# Patient Record
Sex: FEMALE | Race: WHITE | Employment: OTHER | URBAN - METROPOLITAN AREA
[De-identification: names, ages, dates, MRNs, and addresses within clinical notes are randomized per-mention and may not be internally consistent; named-entity substitution may affect disease eponyms.]

---

## 2017-04-05 ENCOUNTER — HOSPITAL ENCOUNTER (EMERGENCY)
Age: 67
Discharge: HOME | End: 2017-04-05
Attending: FAMILY MEDICINE

## 2017-04-05 VITALS
RESPIRATION RATE: 16 BRPM | BODY MASS INDEX: 35.27 KG/M2 | TEMPERATURE: 98.7 F | HEIGHT: 65 IN | OXYGEN SATURATION: 99 % | WEIGHT: 211.7 LBS | SYSTOLIC BLOOD PRESSURE: 206 MMHG | DIASTOLIC BLOOD PRESSURE: 84 MMHG | HEART RATE: 64 BPM

## 2017-04-05 DIAGNOSIS — H18.822 CORNEAL ABRASION OF LEFT EYE DUE TO CONTACT LENS: Primary | ICD-10-CM

## 2017-04-05 DIAGNOSIS — S05.8X2A EYE ABRASION, LEFT, INITIAL ENCOUNTER: ICD-10-CM

## 2017-04-05 RX ORDER — BACITRACIN 500 [USP'U]/G
OINTMENT OPHTHALMIC
Qty: 3.5 G | Refills: 0 | Status: SHIPPED | OUTPATIENT
Start: 2017-04-05 | End: 2018-01-03

## 2017-04-05 RX ORDER — KETOROLAC TROMETHAMINE 5 MG/ML
1 SOLUTION OPHTHALMIC 4 TIMES DAILY
Qty: 1 BOTTLE | Refills: 0 | Status: SHIPPED | OUTPATIENT
Start: 2017-04-05 | End: 2017-04-09

## 2017-04-05 RX ORDER — OMEPRAZOLE 20 MG/1
20 CAPSULE, DELAYED RELEASE ORAL DAILY
COMMUNITY

## 2017-04-05 RX ORDER — OXYCODONE AND ACETAMINOPHEN 5; 325 MG/1; MG/1
1 TABLET ORAL
Qty: 20 TAB | Refills: 0 | Status: SHIPPED | OUTPATIENT
Start: 2017-04-05 | End: 2018-01-03

## 2017-04-05 NOTE — UC PROVIDER NOTE
Patient is a 74 y.o. female presenting with eye pain. The history is provided by the patient. No  was used. Eye Pain    This is a new problem. The current episode started yesterday. The problem occurs constantly. The problem has not changed since onset. The left eye is affected. The injury mechanism was none. The pain is at a severity of 6/10. The pain is moderate. There is no history of trauma (Out playing golf yesterday, felt like sand in eye. + contact lens use. ) to the eye. There is no known exposure to pink eye. She wears contacts. Associated symptoms include photophobia, negative and pain. Pertinent negatives include no discharge, no nausea and no vomiting. Associated symptoms comments: Tearing to left eye. She has tried nothing for the symptoms. The treatment provided no relief. History reviewed. No pertinent past medical history. Past Surgical History:   Procedure Laterality Date    HX CHOLECYSTECTOMY      HX ORTHOPAEDIC      back and foot         History reviewed. No pertinent family history. Social History     Social History    Marital status:      Spouse name: N/A    Number of children: N/A    Years of education: N/A     Occupational History    Not on file. Social History Main Topics    Smoking status: Never Smoker    Smokeless tobacco: Not on file    Alcohol use Not on file    Drug use: Not on file    Sexual activity: Not on file     Other Topics Concern    Not on file     Social History Narrative                ALLERGIES: Review of patient's allergies indicates no known allergies. Review of Systems   Constitutional: Negative. HENT: Negative. Eyes: Positive for photophobia and pain. Negative for discharge. Respiratory: Negative. Cardiovascular: Negative. Gastrointestinal: Negative. Negative for nausea and vomiting. Endocrine: Negative. Genitourinary: Negative. Musculoskeletal: Negative. Neurological: Negative. Hematological: Negative. Psychiatric/Behavioral: Negative. Vitals:    04/05/17 1724   BP: (!) 208/91   Pulse: 64   Resp: 16   Temp: 98.7 °F (37.1 °C)   SpO2: 99%   Weight: 96 kg (211 lb 11.2 oz)   Height: 5' 5\" (1.651 m)       Physical Exam   Constitutional: She is oriented to person, place, and time. She appears well-developed and well-nourished. HENT:   Head: Normocephalic and atraumatic. Right Ear: External ear normal.   Left Ear: External ear normal.   Nose: Nose normal.   Mouth/Throat: Oropharynx is clear and moist.   Eyes: EOM are normal. Pupils are equal, round, and reactive to light. Right eye exhibits no discharge. Left eye exhibits no discharge. No scleral icterus. Neck: Normal range of motion. Neck supple. Cardiovascular: Normal rate, regular rhythm and normal heart sounds. Pulmonary/Chest: Effort normal and breath sounds normal.   Musculoskeletal: Normal range of motion. Lymphadenopathy:     She has no cervical adenopathy. Neurological: She is alert and oriented to person, place, and time. Skin: Skin is warm and dry. Psychiatric: She has a normal mood and affect. Her behavior is normal. Judgment and thought content normal.   Nursing note and vitals reviewed. MDM     Differential Diagnosis; Clinical Impression; Plan:     CLINICAL IMPRESSION:  Corneal abrasion of left eye due to contact lens  (primary encounter diagnosis)    Plan:  1. There is a large corneal abrasion to the left eye. This mostly likely is causing your pain. I will send you home with eye ointment and eye drops for pain. 2. No contacts until you follow up with your eye doctor  3. Percocet for pain. Risk of Significant Complications, Morbidity, and/or Mortality:   Presenting problems: Moderate  Diagnostic procedures:   Moderate  Progress:   Patient progress:  Stable      Procedures

## 2018-01-03 ENCOUNTER — HOSPITAL ENCOUNTER (EMERGENCY)
Age: 68
Discharge: HOME | End: 2018-01-03
Attending: FAMILY MEDICINE

## 2018-01-03 VITALS
HEART RATE: 73 BPM | TEMPERATURE: 97.8 F | OXYGEN SATURATION: 98 % | DIASTOLIC BLOOD PRESSURE: 79 MMHG | RESPIRATION RATE: 18 BRPM | SYSTOLIC BLOOD PRESSURE: 178 MMHG

## 2018-01-03 DIAGNOSIS — J20.9 ACUTE BRONCHITIS, UNSPECIFIED ORGANISM: Primary | ICD-10-CM

## 2018-01-03 RX ORDER — CETIRIZINE HCL 10 MG
10 TABLET ORAL DAILY
Qty: 10 TAB | Refills: 0 | Status: SHIPPED | OUTPATIENT
Start: 2018-01-03 | End: 2018-01-13

## 2018-01-03 RX ORDER — PREDNISONE 20 MG/1
TABLET ORAL
Qty: 8 TAB | Refills: 0 | Status: SHIPPED | OUTPATIENT
Start: 2018-01-03 | End: 2019-07-20

## 2018-01-03 RX ORDER — BENZONATATE 100 MG/1
100 CAPSULE ORAL
Qty: 21 CAP | Refills: 0 | Status: SHIPPED | OUTPATIENT
Start: 2018-01-03 | End: 2018-01-10

## 2018-01-04 NOTE — DISCHARGE INSTRUCTIONS
Bronchitis: Care Instructions  Your Care Instructions    Bronchitis is inflammation of the bronchial tubes, which carry air to the lungs. The tubes swell and produce mucus, or phlegm. The mucus and inflamed bronchial tubes make you cough. You may have trouble breathing. Most cases of bronchitis are caused by viruses like those that cause colds. Antibiotics usually do not help and they may be harmful. Bronchitis usually develops rapidly and lasts about 2 to 3 weeks in otherwise healthy people. Follow-up care is a key part of your treatment and safety. Be sure to make and go to all appointments, and call your doctor if you are having problems. It's also a good idea to know your test results and keep a list of the medicines you take. How can you care for yourself at home? · Take all medicines exactly as prescribed. Call your doctor if you think you are having a problem with your medicine. · Get some extra rest.  · Take an over-the-counter pain medicine, such as acetaminophen (Tylenol), ibuprofen (Advil, Motrin), or naproxen (Aleve) to reduce fever and relieve body aches. Read and follow all instructions on the label. · Do not take two or more pain medicines at the same time unless the doctor told you to. Many pain medicines have acetaminophen, which is Tylenol. Too much acetaminophen (Tylenol) can be harmful. · Take an over-the-counter cough medicine that contains dextromethorphan to help quiet a dry, hacking cough so that you can sleep. Avoid cough medicines that have more than one active ingredient. Read and follow all instructions on the label. · Breathe moist air from a humidifier, hot shower, or sink filled with hot water. The heat and moisture will thin mucus so you can cough it out. · Do not smoke. Smoking can make bronchitis worse. If you need help quitting, talk to your doctor about stop-smoking programs and medicines. These can increase your chances of quitting for good.   When should you call for help? Call 911 anytime you think you may need emergency care. For example, call if:  ? · You have severe trouble breathing. ?Call your doctor now or seek immediate medical care if:  ? · You have new or worse trouble breathing. ? · You cough up dark brown or bloody mucus (sputum). ? · You have a new or higher fever. ? · You have a new rash. ? Watch closely for changes in your health, and be sure to contact your doctor if:  ? · You cough more deeply or more often, especially if you notice more mucus or a change in the color of your mucus. ? · You are not getting better as expected. Where can you learn more? Go to http://ildefonso-lina.info/. Enter H333 in the search box to learn more about \"Bronchitis: Care Instructions. \"  Current as of: May 12, 2017  Content Version: 11.4  © 0867-2346 [employer]. Care instructions adapted under license by [employer] (which disclaims liability or warranty for this information). If you have questions about a medical condition or this instruction, always ask your healthcare professional. [address] any warranty or liability for your use of this information.

## 2018-01-04 NOTE — UC PROVIDER NOTE
HPI Comments:   Here for dry cough. [facility] (2 days ago) given z pack and told to take pseudoephedrine/mucinex. Has taken and hasnt noticed improvement. No worsening. Symptoms intermittent throughout the day. No other associated symptoms. Denies fever, chills, SOB, chest pain, pleuritic pain, dizziness. Otherwise feels well. Difficulty sleeping as coughing so much. Denies: being a smoker, asthma or other lung conditions. Patient is a 69 y.o. female presenting with cough. Cough   Pertinent negatives include no chest pain, no chills, no wheezing, no nausea and no vomiting. History reviewed. No pertinent past medical history. Past Surgical History:   Procedure Laterality Date    HX CHOLECYSTECTOMY      HX ORTHOPAEDIC      back and foot         History reviewed. No pertinent family history. Social History     Social History    Marital status:      Spouse name: N/A    Number of children: N/A    Years of education: N/A     Occupational History    Not on file. Social History Main Topics    Smoking status: Never Smoker    Smokeless tobacco: Not on file    Alcohol use Not on file    Drug use: Not on file    Sexual activity: Not on file     Other Topics Concern    Not on file     Social History Narrative                ALLERGIES: Sulfa (sulfonamide antibiotics)    Review of Systems   Constitutional: Negative for chills, diaphoresis, fatigue and fever. Respiratory: Positive for cough. Negative for chest tightness, wheezing and stridor. Cardiovascular: Negative for chest pain, palpitations and leg swelling. Gastrointestinal: Negative for nausea and vomiting. All other systems reviewed and are negative. Vitals:    01/03/18 2001   BP: 178/79   Pulse: 73   Resp: 18   Temp: 97.8 °F (36.6 °C)   SpO2: 98%       Physical Exam   Constitutional: She is oriented to person, place, and time. No distress.    Appears well   HENT:   Nose: Nose normal.   Mouth/Throat: Oropharynx is clear and moist. No oropharyngeal exudate. Eyes: Conjunctivae and EOM are normal. Pupils are equal, round, and reactive to light. Neck: Normal range of motion. Neck supple. No thyromegaly present. Cardiovascular: Normal rate, regular rhythm and normal heart sounds. Exam reveals no gallop and no friction rub. No murmur heard. Pulmonary/Chest: Effort normal and breath sounds normal. No respiratory distress. She has no wheezes. She has no rales. Lymphadenopathy:     She has no cervical adenopathy. Neurological: She is alert and oriented to person, place, and time. Skin: Skin is warm and dry. She is not diaphoretic. Psychiatric: She has a normal mood and affect. Her behavior is normal. Thought content normal.       [facility]     Differential Diagnosis; Clinical Impression; Plan:       CLINICAL IMPRESSION:  (J20.9) Acute bronchitis, unspecified organism  (primary encounter diagnosis)    Orders Placed This Encounter      predniSONE (DELTASONE) 20 mg tablet      benzonatate (TESSALON PERLES) 100 mg capsule      cetirizine (ZYRTEC) 10 mg tablet    DDX: GERD, reactive airway, PND cough syndrome  VS WNL, no fevers, appears well with normal lung exam.  Discussed treatment options. Will continue azithromycin;. It has also only been 2 days on antibiotic without worsening. Plan:  1. See above orders. 2. Follow up with PCP without improvement in 4-5 days. 3. May return to urgent care for any new or worsening. 4. Consider CXR without improvement on above therapies.     Risk of Significant Complications, Morbidity, and/or Mortality:   Presenting problems:  Low  Diagnostic procedures:  Low  Management options:  Low  Progress:   Patient progress:  Stable      Procedures

## 2019-07-20 ENCOUNTER — OFFICE VISIT (OUTPATIENT)
Dept: URGENT CARE | Age: 69
End: 2019-07-20

## 2019-07-20 VITALS
SYSTOLIC BLOOD PRESSURE: 224 MMHG | OXYGEN SATURATION: 97 % | HEIGHT: 65 IN | TEMPERATURE: 98.5 F | WEIGHT: 218 LBS | DIASTOLIC BLOOD PRESSURE: 92 MMHG | RESPIRATION RATE: 18 BRPM | BODY MASS INDEX: 36.32 KG/M2 | HEART RATE: 75 BPM

## 2019-07-20 DIAGNOSIS — E66.01 SEVERE OBESITY (HCC): ICD-10-CM

## 2019-07-20 RX ORDER — AMOXICILLIN 875 MG/1
875 TABLET, FILM COATED ORAL 2 TIMES DAILY
Qty: 20 TAB | Refills: 0 | Status: SHIPPED | OUTPATIENT
Start: 2019-07-20 | End: 2019-07-30

## 2019-07-20 RX ORDER — AMLODIPINE BESYLATE 10 MG/1
5 TABLET ORAL DAILY
Qty: 30 TAB | Refills: 0 | Status: SHIPPED | OUTPATIENT
Start: 2019-07-20 | End: 2022-06-04

## 2019-07-20 NOTE — PROGRESS NOTES
Subjective:      History was provided by the patient. Oma Sloan is a 77 y.o. female who presents for evaluation of symptoms of a URI. Symptoms include sinus and nasal congestion, sore throat, nasal blockage and post nasal drip. Onset of symptoms was 1 week ago, gradually worsening since that time. She also c/o 1 week productive cough with  yellow colored sputum. She is drinking plenty of fluids. . Evaluation to date: none. Treatment to date: none  History reviewed. No pertinent past medical history. Current Outpatient Medications   Medication Sig Dispense Refill    omeprazole (PRILOSEC) 20 mg capsule Take 20 mg by mouth daily.        Allergies   Allergen Reactions    Codeine Nausea and Vomiting    Sulfa (Sulfonamide Antibiotics) Rash     Social History     Socioeconomic History    Marital status:      Spouse name: Not on file    Number of children: Not on file    Years of education: Not on file    Highest education level: Not on file   Occupational History    Not on file   Social Needs    Financial resource strain: Not on file    Food insecurity:     Worry: Not on file     Inability: Not on file    Transportation needs:     Medical: Not on file     Non-medical: Not on file   Tobacco Use    Smoking status: Never Smoker    Smokeless tobacco: Never Used   Substance and Sexual Activity    Alcohol use: Not on file    Drug use: Not on file    Sexual activity: Not on file   Lifestyle    Physical activity:     Days per week: Not on file     Minutes per session: Not on file    Stress: Not on file   Relationships    Social connections:     Talks on phone: Not on file     Gets together: Not on file     Attends [employer] service: Not on file     Active member of club or organization: Not on file     Attends meetings of clubs or organizations: Not on file     Relationship status: Not on file    Intimate partner violence:     Fear of current or ex partner: Not on file     Emotionally abused: Not on file     Physically abused: Not on file     Forced sexual activity: Not on file   Other Topics Concern    Not on file   Social History Narrative    Not on file     Review of Systems  A comprehensive review of systems was negative except for that written in the HPI. Objective: There were no vitals taken for this visit. General:  alert, cooperative, no distress, appears stated age   Head:  NCAT w/o lesions or tenderness   Eyes: negative   Ears: normal TM's and external ear canals AU   Sinus tender: negative   Mouth:  Lips, mucosa, and tongue normal. Teeth and gums normal   Neck: supple, symmetrical, trachea midline, no adenopathy, thyroid: not enlarged, symmetric, no tenderness/mass/nodules, no carotid bruit and no JVD. Lungs: clear to auscultation bilaterally   Abdomen: soft, non-tender. Bowel sounds normal. No masses,  no organomegaly        Assessment:     sinusitis and bronchitis    Plan:   Discussed the dx and tx of sinusitis. Suggested symptomatic OTC remedies. Antibiotics per orders. RTC prn.

## 2019-07-20 NOTE — PATIENT INSTRUCTIONS
SEE YOUR PCP Monday   Amlodipine (By mouth)   Amlodipine ([id number]-[id number])  Treats high blood pressure and angina (chest pain). This medicine is a calcium channel blocker. Brand Name(s): Norvasc   There may be other brand names for this medicine. When This Medicine Should Not Be Used: This medicine is not right for everyone. Do not use it if you had an allergic reaction to amlodipine. How to Use This Medicine:   Tablet, Dissolving Tablet  · Take your medicine as directed. Your dose may need to be changed several times to find what works best for you. Take this medicine at the same time each day. · Read and follow the patient instructions that come with this medicine. Talk to your doctor or pharmacist if you have any questions. · Missed dose: Take a dose as soon as you remember. If it has been more than 12 hours since you were supposed to take your dose, skip the missed dose and take your next regular dose at the regular time. · Store the medicine in a closed container at room temperature, away from heat, moisture, and direct light. Drugs and Foods to Avoid:   Ask your doctor or pharmacist before using any other medicine, including over-the-counter medicines, vitamins, and herbal products. · Some medicines can affect how amlodipine works. Tell your doctor if you are also using any of the following:   ¨ Clarithromycin, cyclosporine, diltiazem, itraconazole, ritonavir, sildenafil, simvastatin, tacrolimus  Warnings While Using This Medicine:   · Tell your doctor if you are pregnant or breastfeeding, or if you have liver disease, heart disease, coronary artery disease, or aortic stenosis. · This medicine could lower your blood pressure too much, especially when you first use it or if you are dehydrated. Stand or sit up slowly if you feel lightheaded or dizzy. · Your doctor will check your progress and the effects of this medicine at regular visits. Keep all appointments.   · Do not stop using this medicine without asking your doctor, even if you feel well. This medicine will not cure high blood pressure, but it will help keep it in normal range. You may have to take blood pressure medicine for the rest of your life. · Keep all medicine out of the reach of children. Never share your medicine with anyone. Possible Side Effects While Using This Medicine:   Call your doctor right away if you notice any of these side effects:  · Allergic reaction: Itching or hives, swelling in your face or hands, swelling or tingling in your mouth or throat, chest tightness, trouble breathing  · Lightheadedness, dizziness  · New or worsening chest pain  · Swelling in your hands, ankles, or legs  · Trouble breathing, nausea, unusual sweating, fainting  If you notice other side effects that you think are caused by this medicine, tell your doctor. Call your doctor for medical advice about side effects. You may report side effects to FDA at 2-124-FDA-8872  © 2017 [facility] Information is for End User's use only and may not be sold, redistributed or otherwise used for commercial purposes. The above information is an  only. It is not intended as medical advice for individual conditions or treatments. Talk to your doctor, nurse or pharmacist before following any medical regimen to see if it is safe and effective for you. Home Blood Pressure Test: About This Test  What is it? A home blood pressure test allows you to keep track of your blood pressure at home. Blood pressure is a measure of the force of blood against the walls of your arteries. Blood pressure readings include two numbers, such as 130/80 (say \"130 over 80\"). The first number is the systolic pressure. The second number is the diastolic pressure. Why is this test done? You may do this test at home to:  · Find out if you have high blood pressure. · Track your blood pressure if you have high blood pressure.   · Track how well medicine is working to reduce high blood pressure. · Check how lifestyle changes, such as weight loss and exercise, are affecting blood pressure. How can you prepare for the test?  · Do not use caffeine, tobacco, or medicines known to raise blood pressure (such as nasal decongestant sprays) for at least 30 minutes before taking your blood pressure. · Do not exercise for at least 30 minutes before taking your blood pressure. What happens before the test?  Take your blood pressure while you feel comfortable and relaxed. Sit quietly with both feet on the floor for at least 5 minutes before the test.  What happens during the test?  · Sit with your arm slightly bent and resting on a table so that your upper arm is at the same level as your heart. · Roll up your sleeve or take off your shirt to expose your upper arm. · Wrap the blood pressure cuff around your upper arm so that the lower edge of the cuff is about 1 inch above the bend of your elbow. Proceed with the following steps depending on if you are using an automatic or manual pressure monitor. Automatic blood pressure monitors  · Press the on/off button on the automatic monitor and wait until the ready-to-measure \"heart\" symbol appears next to zero in the display window. · Press the start button. The cuff will inflate and deflate by itself. · Your blood pressure numbers will appear on the screen. · Write your numbers in your log book, along with the date and time. Manual blood pressure monitors  · Place the earpieces of a stethoscope in your ears, and place the bell of the stethoscope over the artery, just below the cuff. · Close the valve on the rubber inflating bulb. · Squeeze the bulb rapidly with your opposite hand to inflate the cuff until the dial or column of mercury reads about 30 mm Hg higher than your usual systolic pressure. If you do not know your usual pressure, inflate the cuff to 210 mm Hg or until the pulse at your wrist disappears.   · Open the pressure valve just slightly by twisting or pressing the valve on the bulb. · As you watch the pressure slowly fall, note the level on the dial at which you first start to hear a pulsing or tapping sound through the stethoscope. This is your systolic blood pressure. · Continue letting the air out slowly. The sounds will become muffled and will finally disappear. Note the pressure when the sounds completely disappear. This is your diastolic blood pressure. Let out all the remaining air. · Write your numbers in your log book, along with the date and time. What else should you know about the test?  It is more accurate to take the average of several readings made throughout the day than to rely on a single reading. It's normal for blood pressure to go up and down throughout the day. Follow-up care is a key part of your treatment and safety. Be sure to make and go to all appointments, and call your doctor if you are having problems. It's also a good idea to keep a list of the medicines you take. Where can you learn more? Go to http://ildefonso-lina.info/. Enter C427 in the search box to learn more about \"Home Blood Pressure Test: About This Test.\"  Current as of: July 22, 2018  Content Version: 12.1  © 4774-2754 [employer]. Care instructions adapted under license by [employer] (which disclaims liability or warranty for this information). If you have questions about a medical condition or this instruction, always ask your healthcare professional. [place] [address] any warranty or liability for your use of this information.

## 2022-03-19 PROBLEM — E66.01 SEVERE OBESITY (HCC): Status: ACTIVE | Noted: 2019-07-20

## 2022-06-04 ENCOUNTER — OFFICE VISIT (OUTPATIENT)
Dept: URGENT CARE | Age: 72
End: 2022-06-04
Payer: MEDICARE

## 2022-06-04 VITALS
HEIGHT: 66 IN | OXYGEN SATURATION: 97 % | TEMPERATURE: 98.4 F | BODY MASS INDEX: 34.72 KG/M2 | RESPIRATION RATE: 16 BRPM | WEIGHT: 216 LBS | HEART RATE: 69 BPM | DIASTOLIC BLOOD PRESSURE: 88 MMHG | SYSTOLIC BLOOD PRESSURE: 200 MMHG

## 2022-06-04 DIAGNOSIS — R05.9 COUGH: ICD-10-CM

## 2022-06-04 DIAGNOSIS — I10 HYPERTENSION, UNSPECIFIED TYPE: Primary | ICD-10-CM

## 2022-06-04 DIAGNOSIS — J06.9 VIRAL UPPER RESPIRATORY TRACT INFECTION: ICD-10-CM

## 2022-06-04 PROCEDURE — G8536 NO DOC ELDER MAL SCRN: HCPCS | Performed by: NURSE PRACTITIONER

## 2022-06-04 PROCEDURE — 1090F PRES/ABSN URINE INCON ASSESS: CPT | Performed by: NURSE PRACTITIONER

## 2022-06-04 PROCEDURE — 99213 OFFICE O/P EST LOW 20 MIN: CPT | Performed by: NURSE PRACTITIONER

## 2022-06-04 PROCEDURE — 1101F PT FALLS ASSESS-DOCD LE1/YR: CPT | Performed by: NURSE PRACTITIONER

## 2022-06-04 PROCEDURE — G8432 DEP SCR NOT DOC, RNG: HCPCS | Performed by: NURSE PRACTITIONER

## 2022-06-04 PROCEDURE — G8417 CALC BMI ABV UP PARAM F/U: HCPCS | Performed by: NURSE PRACTITIONER

## 2022-06-04 PROCEDURE — G8753 SYS BP > OR = 140: HCPCS | Performed by: NURSE PRACTITIONER

## 2022-06-04 PROCEDURE — G8754 DIAS BP LESS 90: HCPCS | Performed by: NURSE PRACTITIONER

## 2022-06-04 PROCEDURE — 1123F ACP DISCUSS/DSCN MKR DOCD: CPT | Performed by: NURSE PRACTITIONER

## 2022-06-04 PROCEDURE — G8427 DOCREV CUR MEDS BY ELIG CLIN: HCPCS | Performed by: NURSE PRACTITIONER

## 2022-06-04 PROCEDURE — G8400 PT W/DXA NO RESULTS DOC: HCPCS | Performed by: NURSE PRACTITIONER

## 2022-06-04 PROCEDURE — 3017F COLORECTAL CA SCREEN DOC REV: CPT | Performed by: NURSE PRACTITIONER

## 2022-06-04 RX ORDER — GUAIFENESIN 600 MG/1
600 TABLET, EXTENDED RELEASE ORAL 2 TIMES DAILY
Qty: 14 TABLET | Refills: 0 | Status: SHIPPED | OUTPATIENT
Start: 2022-06-04

## 2022-06-04 RX ORDER — AMLODIPINE BESYLATE 5 MG/1
5 TABLET ORAL DAILY
Qty: 10 TABLET | Refills: 0 | Status: SHIPPED | OUTPATIENT
Start: 2022-06-04

## 2022-06-04 RX ORDER — BENZONATATE 100 MG/1
100 CAPSULE ORAL
Qty: 21 CAPSULE | Refills: 0 | Status: SHIPPED | OUTPATIENT
Start: 2022-06-04 | End: 2022-06-11

## 2022-06-04 RX ORDER — MELATONIN
1000 DAILY
COMMUNITY

## 2022-06-04 NOTE — PROGRESS NOTES
OFFICE NOTE    Jeny Mac is a 66 y.o. female presenting today for the following:  Chief Complaint   Patient presents with    Cold Symptoms     Pt here today with complaint of stuffy nose, sinus pressure, chest congestion since yesterday. Had sore throat yesterday, somewhat improved today. Reports cough productive of yellow sputum. HPI     Patient is coming in the  clinic today with symptoms of sinus congestion, chest congestion and sore throat and cough of yellow sputum. Patient states sore throat started yesterday but have improved. Today patient blood pressure is elevated and does not see her PCP regularly. Patient denies chest pain, dyspnea or headache. She states she have a new PCP named Soha Lin. Patient has taken amlodipine in the past and states she have not went back to her PCP for assessment and refill. She states she did well on the amlodipine but have not taken it in over a year. Informed patient today I will start her back on the amlodipine but smaller dose due todays elevated BP x 2. Advised patient to monitor her blood pressure readings while on the amlodipine and call her pcp Monday morning due to the elevated BP and to continue the management of the BP medication if needed. Patient agreed with plan today. Patient denies any pain today. Review of Systems   Constitutional: Negative for chills, fatigue and fever. HENT: Positive for congestion (sinus), postnasal drip, rhinorrhea and sore throat. Negative for ear discharge, ear pain, hearing loss, sinus pressure, sinus pain, trouble swallowing and voice change. Eyes: Negative. Respiratory: Positive for cough. Negative for shortness of breath and wheezing. Cardiovascular: Negative for chest pain. Gastrointestinal: Negative for nausea and vomiting. Genitourinary: Negative. Musculoskeletal: Negative. Skin: Negative.     Neurological: Negative for dizziness, syncope, weakness, light-headedness and headaches. History  History reviewed. No pertinent past medical history. Past Surgical History:   Procedure Laterality Date    HX CHOLECYSTECTOMY      HX ORTHOPAEDIC      back and foot       Social History     Socioeconomic History    Marital status:      Spouse name: Not on file    Number of children: Not on file    Years of education: Not on file    Highest education level: Not on file   Occupational History    Not on file   Tobacco Use    Smoking status: Never Smoker    Smokeless tobacco: Never Used   Substance and Sexual Activity    Alcohol use: Not on file    Drug use: Not on file    Sexual activity: Not on file   Other Topics Concern    Not on file   Social History Narrative    Not on file     Social Determinants of Health     Financial Resource Strain:     Difficulty of Paying Living Expenses: Not on file   Food Insecurity:     Worried About Running Out of Food in the Last Year: Not on file    Otto of Food in the Last Year: Not on file   Transportation Needs:     Lack of Transportation (Medical): Not on file    Lack of Transportation (Non-Medical):  Not on file   Physical Activity:     Days of Exercise per Week: Not on file    Minutes of Exercise per Session: Not on file   Stress:     Feeling of Stress : Not on file   Social Connections:     Frequency of Communication with Friends and Family: Not on file    Frequency of Social Gatherings with Friends and Family: Not on file    Attends Evangelical Services: Not on file    Active Member of Clubs or Organizations: Not on file    Attends Club or Organization Meetings: Not on file    Marital Status: Not on file   Intimate Partner Violence:     Fear of Current or Ex-Partner: Not on file    Emotionally Abused: Not on file    Physically Abused: Not on file    Sexually Abused: Not on file   Housing Stability:     Unable to Pay for Housing in the Last Year: Not on file    Number of [place] in the Last Year: Not on file    Unstable Housing in the Last Year: Not on file       Allergies   Allergen Reactions    Codeine Nausea and Vomiting    Sulfa (Sulfonamide Antibiotics) Rash       Current Outpatient Medications   Medication Sig Dispense Refill    cholecalciferol (Vitamin D3) (1000 Units /25 mcg) tablet Take 1,000 Units by mouth daily.  amLODIPine (NORVASC) 5 mg tablet Take 1 Tablet by mouth daily. 10 Tablet 0    benzonatate (TESSALON) 100 mg capsule Take 1 Capsule by mouth three (3) times daily as needed for Cough for up to 7 days. 21 Capsule 0    guaiFENesin ER (MUCINEX) 600 mg ER tablet Take 1 Tablet by mouth two (2) times a day. 14 Tablet 0    omeprazole (PRILOSEC) 20 mg capsule Take 20 mg by mouth daily. Patient Care Team:  Patient Care Team:  Maritza Hilton DO as PCP - General (Family Medicine)      LABS:  No results found for any visits on 06/04/22. RADIOLOGY:  No recent results      Physical Exam  Constitutional:       Appearance: Normal appearance. She is not ill-appearing. HENT:      Right Ear: Tympanic membrane, ear canal and external ear normal.      Left Ear: Tympanic membrane, ear canal and external ear normal.      Nose: Congestion and rhinorrhea present. Mouth/Throat:      Pharynx: No posterior oropharyngeal erythema. Comments: Post nasal drainage noted  Cardiovascular:      Rate and Rhythm: Normal rate and regular rhythm. Pulses: Normal pulses. Pulmonary:      Effort: Pulmonary effort is normal. No respiratory distress. Breath sounds: Normal breath sounds. No wheezing, rhonchi or rales. Musculoskeletal:      Cervical back: Normal range of motion. No rigidity or tenderness. Lymphadenopathy:      Cervical: No cervical adenopathy. Neurological:      Mental Status: She is alert and oriented to person, place, and time.    Psychiatric:         Mood and Affect: Mood normal.           Vitals:    06/04/22 0956 06/04/22 1024   BP: (!) 182/92 (!) 200/88 Pulse: 69    Resp: 16    Temp: 98.4 °F (36.9 °C)    SpO2: 97%    Weight: 216 lb (98 kg)    Height: 5' 6\" (1.676 m)          Assessment and Plan    1. Hypertension, unspecified type  Advised to monitor blood pressure readings while on this medication and call her PCP on Monday morning to follow up. Patient agreed to plan. - amLODIPine (NORVASC) 5 mg tablet; Take 1 Tablet by mouth daily. Dispense: 10 Tablet; Refill: 0    2. Viral upper respiratory tract infection  - Mucinex 600 mg ER  Take 1 Tablet by mouth two (2) times a day., Normal, Disp-14 Tablet, R-0    3. Cough  - Take 1 Capsule by mouth three (3) times daily as needed for Cough for up to 7  days. , Normal, Disp-21 Capsule, R-0    MDM    Procedures      *Plan of care reviewed with patient. Patient in agreement with plan and expresses understanding. All questions answered and patient encouraged to call or RTO if further questions or concerns. Advised patient if symptoms worsen to go to nearest ER or call 911. AVS and recommendations given to patient upon discharge.

## 2025-01-13 ENCOUNTER — HOSPITAL ENCOUNTER (INPATIENT)
Facility: HOSPITAL | Age: 75
LOS: 6 days | Discharge: NURSING, REHAB OR LONG-TERM CARE | DRG: 250 | End: 2025-01-20
Attending: EMERGENCY MEDICINE | Admitting: INTERNAL MEDICINE
Payer: MEDICARE

## 2025-01-13 ENCOUNTER — APPOINTMENT (OUTPATIENT)
Facility: HOSPITAL | Age: 75
DRG: 250 | End: 2025-01-13
Payer: MEDICARE

## 2025-01-13 DIAGNOSIS — I46.9 CARDIAC ARREST: Primary | ICD-10-CM

## 2025-01-13 DIAGNOSIS — I21.3 ST ELEVATION MYOCARDIAL INFARCTION (STEMI), UNSPECIFIED ARTERY (HCC): ICD-10-CM

## 2025-01-13 DIAGNOSIS — I21.3 STEMI (ST ELEVATION MYOCARDIAL INFARCTION) (HCC): ICD-10-CM

## 2025-01-13 DIAGNOSIS — I21.02 ST ELEVATION MYOCARDIAL INFARCTION INVOLVING LEFT ANTERIOR DESCENDING (LAD) CORONARY ARTERY (HCC): ICD-10-CM

## 2025-01-13 LAB
ALBUMIN SERPL-MCNC: 3.3 G/DL (ref 3.5–5)
ALBUMIN/GLOB SERPL: 0.8 (ref 1.1–2.2)
ALP SERPL-CCNC: 85 U/L (ref 45–117)
ALT SERPL-CCNC: 114 U/L (ref 12–78)
ANION GAP SERPL CALC-SCNC: 16 MMOL/L (ref 2–12)
ARTERIAL PATENCY WRIST A: ABNORMAL
AST SERPL-CCNC: 141 U/L (ref 15–37)
BASE DEFICIT BLDA-SCNC: 6.8 MMOL/L
BASOPHILS # BLD: 0 K/UL (ref 0–0.1)
BASOPHILS NFR BLD: 0 % (ref 0–1)
BDY SITE: ABNORMAL
BILIRUB SERPL-MCNC: 0.4 MG/DL (ref 0.2–1)
BODY TEMPERATURE: 37
BUN SERPL-MCNC: 24 MG/DL (ref 6–20)
BUN/CREAT SERPL: 13 (ref 12–20)
CALCIUM SERPL-MCNC: 8.9 MG/DL (ref 8.5–10.1)
CHLORIDE SERPL-SCNC: 100 MMOL/L (ref 97–108)
CO2 SERPL-SCNC: 20 MMOL/L (ref 21–32)
CREAT SERPL-MCNC: 1.82 MG/DL (ref 0.55–1.02)
DIFFERENTIAL METHOD BLD: ABNORMAL
EOSINOPHIL # BLD: 0.19 K/UL (ref 0–0.4)
EOSINOPHIL NFR BLD: 1 % (ref 0–7)
ERYTHROCYTE [DISTWIDTH] IN BLOOD BY AUTOMATED COUNT: 13.5 % (ref 11.5–14.5)
FIO2 ON VENT: 100 %
GAS FLOW.O2 SETTING OXYMISER: 16
GLOBULIN SER CALC-MCNC: 4.2 G/DL (ref 2–4)
GLUCOSE SERPL-MCNC: 310 MG/DL (ref 65–100)
HCO3 BLDA-SCNC: 19 MMOL/L (ref 22–26)
HCT VFR BLD AUTO: 42.8 % (ref 35–47)
HGB BLD-MCNC: 13.8 G/DL (ref 11.5–16)
IMM GRANULOCYTES # BLD AUTO: 0 K/UL (ref 0–0.04)
IMM GRANULOCYTES NFR BLD AUTO: 0 % (ref 0–0.5)
LYMPHOCYTES # BLD: 8.88 K/UL (ref 0.8–3.5)
LYMPHOCYTES NFR BLD: 48 % (ref 12–49)
MAGNESIUM SERPL-MCNC: 2 MG/DL (ref 1.6–2.4)
MCH RBC QN AUTO: 32.9 PG (ref 26–34)
MCHC RBC AUTO-ENTMCNC: 32.2 G/DL (ref 30–36.5)
MCV RBC AUTO: 101.9 FL (ref 80–99)
MONOCYTES # BLD: 0.56 K/UL (ref 0–1)
MONOCYTES NFR BLD: 3 % (ref 5–13)
NEUTS SEG # BLD: 8.87 K/UL (ref 1.8–8)
NEUTS SEG NFR BLD: 48 % (ref 32–75)
NRBC # BLD: 0.03 K/UL (ref 0–0.01)
NRBC BLD-RTO: 0.2 PER 100 WBC
PCO2 BLDA: 41 MMHG (ref 35–45)
PEEP RESPIRATORY: 8
PH BLDA: 7.29 (ref 7.35–7.45)
PLATELET # BLD AUTO: 189 K/UL (ref 150–400)
PMV BLD AUTO: 11.3 FL (ref 8.9–12.9)
PO2 BLDA: 81 MMHG (ref 80–100)
POTASSIUM SERPL-SCNC: 3.2 MMOL/L (ref 3.5–5.1)
PROT SERPL-MCNC: 7.5 G/DL (ref 6.4–8.2)
RBC # BLD AUTO: 4.2 M/UL (ref 3.8–5.2)
RBC MORPH BLD: ABNORMAL
SAO2 % BLD: 95 % (ref 92–97)
SAO2% DEVICE SAO2% SENSOR NAME: ABNORMAL
SODIUM SERPL-SCNC: 136 MMOL/L (ref 136–145)
SPECIMEN SITE: ABNORMAL
TROPONIN I SERPL HS-MCNC: 262 NG/L (ref 0–51)
VENTILATION MODE VENT: ABNORMAL
VT SETTING VENT: 400
WBC # BLD AUTO: 18.5 K/UL (ref 3.6–11)

## 2025-01-13 PROCEDURE — 82803 BLOOD GASES ANY COMBINATION: CPT

## 2025-01-13 PROCEDURE — 6360000004 HC RX CONTRAST MEDICATION: Performed by: INTERNAL MEDICINE

## 2025-01-13 PROCEDURE — 99152 MOD SED SAME PHYS/QHP 5/>YRS: CPT | Performed by: INTERNAL MEDICINE

## 2025-01-13 PROCEDURE — 93458 L HRT ARTERY/VENTRICLE ANGIO: CPT | Performed by: INTERNAL MEDICINE

## 2025-01-13 PROCEDURE — 6360000002 HC RX W HCPCS: Performed by: INTERNAL MEDICINE

## 2025-01-13 PROCEDURE — 99153 MOD SED SAME PHYS/QHP EA: CPT | Performed by: INTERNAL MEDICINE

## 2025-01-13 PROCEDURE — 80053 COMPREHEN METABOLIC PANEL: CPT

## 2025-01-13 PROCEDURE — 6360000002 HC RX W HCPCS

## 2025-01-13 PROCEDURE — 5A1945Z RESPIRATORY VENTILATION, 24-96 CONSECUTIVE HOURS: ICD-10-PCS | Performed by: INTERNAL MEDICINE

## 2025-01-13 PROCEDURE — 99291 CRITICAL CARE FIRST HOUR: CPT

## 2025-01-13 PROCEDURE — 83735 ASSAY OF MAGNESIUM: CPT

## 2025-01-13 PROCEDURE — 36415 COLL VENOUS BLD VENIPUNCTURE: CPT

## 2025-01-13 PROCEDURE — 85025 COMPLETE CBC W/AUTO DIFF WBC: CPT

## 2025-01-13 PROCEDURE — 2500000003 HC RX 250 WO HCPCS: Performed by: INTERNAL MEDICINE

## 2025-01-13 PROCEDURE — 85347 COAGULATION TIME ACTIVATED: CPT

## 2025-01-13 PROCEDURE — C1894 INTRO/SHEATH, NON-LASER: HCPCS | Performed by: INTERNAL MEDICINE

## 2025-01-13 PROCEDURE — 6370000000 HC RX 637 (ALT 250 FOR IP): Performed by: EMERGENCY MEDICINE

## 2025-01-13 PROCEDURE — 71045 X-RAY EXAM CHEST 1 VIEW: CPT

## 2025-01-13 PROCEDURE — 96375 TX/PRO/DX INJ NEW DRUG ADDON: CPT

## 2025-01-13 PROCEDURE — 84484 ASSAY OF TROPONIN QUANT: CPT

## 2025-01-13 PROCEDURE — C1713 ANCHOR/SCREW BN/BN,TIS/BN: HCPCS | Performed by: INTERNAL MEDICINE

## 2025-01-13 PROCEDURE — B2111ZZ FLUOROSCOPY OF MULTIPLE CORONARY ARTERIES USING LOW OSMOLAR CONTRAST: ICD-10-PCS | Performed by: INTERNAL MEDICINE

## 2025-01-13 PROCEDURE — C1725 CATH, TRANSLUMIN NON-LASER: HCPCS | Performed by: INTERNAL MEDICINE

## 2025-01-13 PROCEDURE — C9600 PERC DRUG-EL COR STENT SING: HCPCS | Performed by: INTERNAL MEDICINE

## 2025-01-13 PROCEDURE — 37799 UNLISTED PX VASCULAR SURGERY: CPT

## 2025-01-13 PROCEDURE — 6360000002 HC RX W HCPCS: Performed by: EMERGENCY MEDICINE

## 2025-01-13 PROCEDURE — 70450 CT HEAD/BRAIN W/O DYE: CPT

## 2025-01-13 PROCEDURE — 2709999900 HC NON-CHARGEABLE SUPPLY: Performed by: INTERNAL MEDICINE

## 2025-01-13 PROCEDURE — 6370000000 HC RX 637 (ALT 250 FOR IP): Performed by: INTERNAL MEDICINE

## 2025-01-13 PROCEDURE — C1769 GUIDE WIRE: HCPCS | Performed by: INTERNAL MEDICINE

## 2025-01-13 PROCEDURE — 4A023N7 MEASUREMENT OF CARDIAC SAMPLING AND PRESSURE, LEFT HEART, PERCUTANEOUS APPROACH: ICD-10-PCS | Performed by: INTERNAL MEDICINE

## 2025-01-13 PROCEDURE — C1887 CATHETER, GUIDING: HCPCS | Performed by: INTERNAL MEDICINE

## 2025-01-13 PROCEDURE — 02703ZZ DILATION OF CORONARY ARTERY, ONE ARTERY, PERCUTANEOUS APPROACH: ICD-10-PCS | Performed by: INTERNAL MEDICINE

## 2025-01-13 PROCEDURE — 96365 THER/PROPH/DIAG IV INF INIT: CPT

## 2025-01-13 PROCEDURE — 94002 VENT MGMT INPAT INIT DAY: CPT

## 2025-01-13 RX ORDER — HEPARIN SODIUM 1000 [USP'U]/ML
INJECTION, SOLUTION INTRAVENOUS; SUBCUTANEOUS PRN
Status: DISCONTINUED | OUTPATIENT
Start: 2025-01-13 | End: 2025-01-14 | Stop reason: HOSPADM

## 2025-01-13 RX ORDER — HEPARIN SODIUM 10000 [USP'U]/ML
INJECTION, SOLUTION INTRAVENOUS; SUBCUTANEOUS PRN
Status: DISCONTINUED | OUTPATIENT
Start: 2025-01-13 | End: 2025-01-14 | Stop reason: HOSPADM

## 2025-01-13 RX ORDER — POTASSIUM CHLORIDE 7.45 MG/ML
10 INJECTION INTRAVENOUS ONCE
Status: COMPLETED | OUTPATIENT
Start: 2025-01-14 | End: 2025-01-13

## 2025-01-13 RX ORDER — DOPAMINE HYDROCHLORIDE 320 MG/100ML
INJECTION, SOLUTION INTRAVENOUS
Status: COMPLETED
Start: 2025-01-13 | End: 2025-01-13

## 2025-01-13 RX ORDER — DEXMEDETOMIDINE HYDROCHLORIDE 4 UG/ML
.1-1.5 INJECTION, SOLUTION INTRAVENOUS CONTINUOUS
Status: DISCONTINUED | OUTPATIENT
Start: 2025-01-13 | End: 2025-01-14

## 2025-01-13 RX ORDER — IOPAMIDOL 755 MG/ML
INJECTION, SOLUTION INTRAVASCULAR PRN
Status: DISCONTINUED | OUTPATIENT
Start: 2025-01-13 | End: 2025-01-14 | Stop reason: HOSPADM

## 2025-01-13 RX ORDER — FUROSEMIDE 10 MG/ML
40 INJECTION INTRAMUSCULAR; INTRAVENOUS
Status: DISCONTINUED | OUTPATIENT
Start: 2025-01-13 | End: 2025-01-13

## 2025-01-13 RX ORDER — HEPARIN SODIUM 1000 [USP'U]/ML
4000 INJECTION, SOLUTION INTRAVENOUS; SUBCUTANEOUS
Status: COMPLETED | OUTPATIENT
Start: 2025-01-13 | End: 2025-01-13

## 2025-01-13 RX ORDER — DOPAMINE HYDROCHLORIDE 320 MG/100ML
1-20 INJECTION, SOLUTION INTRAVENOUS CONTINUOUS
Status: DISCONTINUED | OUTPATIENT
Start: 2025-01-13 | End: 2025-01-13

## 2025-01-13 RX ORDER — SODIUM CHLORIDE 0.9 % (FLUSH) 0.9 %
5-40 SYRINGE (ML) INJECTION PRN
Status: DISCONTINUED | OUTPATIENT
Start: 2025-01-13 | End: 2025-01-16

## 2025-01-13 RX ORDER — VERAPAMIL HYDROCHLORIDE 2.5 MG/ML
INJECTION, SOLUTION INTRAVENOUS PRN
Status: DISCONTINUED | OUTPATIENT
Start: 2025-01-13 | End: 2025-01-14 | Stop reason: HOSPADM

## 2025-01-13 RX ORDER — ACETAMINOPHEN 325 MG/1
650 TABLET ORAL EVERY 4 HOURS PRN
Status: DISCONTINUED | OUTPATIENT
Start: 2025-01-13 | End: 2025-01-16 | Stop reason: SDUPTHER

## 2025-01-13 RX ORDER — ASPIRIN 81 MG/1
81 TABLET, CHEWABLE ORAL DAILY
Status: DISCONTINUED | OUTPATIENT
Start: 2025-01-14 | End: 2025-01-16

## 2025-01-13 RX ORDER — SODIUM CHLORIDE 9 MG/ML
INJECTION, SOLUTION INTRAVENOUS PRN
Status: DISCONTINUED | OUTPATIENT
Start: 2025-01-13 | End: 2025-01-16

## 2025-01-13 RX ORDER — ASPIRIN 300 MG/1
300 SUPPOSITORY RECTAL
Status: COMPLETED | OUTPATIENT
Start: 2025-01-13 | End: 2025-01-13

## 2025-01-13 RX ORDER — SODIUM CHLORIDE 0.9 % (FLUSH) 0.9 %
5-40 SYRINGE (ML) INJECTION EVERY 12 HOURS SCHEDULED
Status: DISCONTINUED | OUTPATIENT
Start: 2025-01-14 | End: 2025-01-16

## 2025-01-13 RX ADMIN — POTASSIUM CHLORIDE 10 MEQ: 7.46 INJECTION, SOLUTION INTRAVENOUS at 23:25

## 2025-01-13 RX ADMIN — ASPIRIN 300 MG: 300 SUPPOSITORY RECTAL at 21:50

## 2025-01-13 RX ADMIN — DOPAMINE HYDROCHLORIDE 5 MCG/KG/MIN: 320 INJECTION, SOLUTION INTRAVENOUS at 22:44

## 2025-01-13 RX ADMIN — HEPARIN SODIUM 4000 UNITS: 1000 INJECTION INTRAVENOUS; SUBCUTANEOUS at 21:48

## 2025-01-13 ASSESSMENT — PAIN - FUNCTIONAL ASSESSMENT: PAIN_FUNCTIONAL_ASSESSMENT: NONE - DENIES PAIN

## 2025-01-13 ASSESSMENT — PULMONARY FUNCTION TESTS: PIF_VALUE: 21

## 2025-01-14 ENCOUNTER — APPOINTMENT (OUTPATIENT)
Facility: HOSPITAL | Age: 75
DRG: 250 | End: 2025-01-14
Payer: MEDICARE

## 2025-01-14 PROBLEM — R40.4 UNRESPONSIVE EPISODE: Status: ACTIVE | Noted: 2025-01-14

## 2025-01-14 LAB
ACT BLD: 198 SECS (ref 79–138)
ACT BLD: 239 SECS (ref 79–138)
ALBUMIN SERPL-MCNC: 2.7 G/DL (ref 3.5–5)
ALBUMIN SERPL-MCNC: 2.8 G/DL (ref 3.5–5)
ALBUMIN/GLOB SERPL: 0.8 (ref 1.1–2.2)
ALBUMIN/GLOB SERPL: 0.8 (ref 1.1–2.2)
ALP SERPL-CCNC: 56 U/L (ref 45–117)
ALP SERPL-CCNC: 58 U/L (ref 45–117)
ALT SERPL-CCNC: 108 U/L (ref 12–78)
ALT SERPL-CCNC: 109 U/L (ref 12–78)
AMORPH CRY URNS QL MICRO: ABNORMAL
ANION GAP SERPL CALC-SCNC: 10 MMOL/L (ref 2–12)
ANION GAP SERPL CALC-SCNC: 12 MMOL/L (ref 2–12)
ANION GAP SERPL CALC-SCNC: 9 MMOL/L (ref 2–12)
ANION GAP SERPL CALC-SCNC: 9 MMOL/L (ref 2–12)
APPEARANCE UR: CLEAR
ARTERIAL PATENCY WRIST A: ABNORMAL
AST SERPL-CCNC: 195 U/L (ref 15–37)
AST SERPL-CCNC: 214 U/L (ref 15–37)
BACTERIA URNS QL MICRO: NEGATIVE /HPF
BASE DEFICIT BLDA-SCNC: 5.6 MMOL/L
BDY SITE: ABNORMAL
BILIRUB DIRECT SERPL-MCNC: 0.1 MG/DL (ref 0–0.2)
BILIRUB DIRECT SERPL-MCNC: 0.1 MG/DL (ref 0–0.2)
BILIRUB SERPL-MCNC: 0.4 MG/DL (ref 0.2–1)
BILIRUB SERPL-MCNC: 0.9 MG/DL (ref 0.2–1)
BILIRUB UR QL: NEGATIVE
BODY TEMPERATURE: 37
BUN SERPL-MCNC: 21 MG/DL (ref 6–20)
BUN SERPL-MCNC: 27 MG/DL (ref 6–20)
BUN SERPL-MCNC: 27 MG/DL (ref 6–20)
BUN SERPL-MCNC: 28 MG/DL (ref 6–20)
BUN/CREAT SERPL: 19 (ref 12–20)
BUN/CREAT SERPL: 20 (ref 12–20)
BUN/CREAT SERPL: 20 (ref 12–20)
BUN/CREAT SERPL: 21 (ref 12–20)
CALCIUM SERPL-MCNC: 7.4 MG/DL (ref 8.5–10.1)
CALCIUM SERPL-MCNC: 7.8 MG/DL (ref 8.5–10.1)
CALCIUM SERPL-MCNC: 8.2 MG/DL (ref 8.5–10.1)
CALCIUM SERPL-MCNC: 8.2 MG/DL (ref 8.5–10.1)
CHLORIDE SERPL-SCNC: 104 MMOL/L (ref 97–108)
CHLORIDE SERPL-SCNC: 104 MMOL/L (ref 97–108)
CHLORIDE SERPL-SCNC: 105 MMOL/L (ref 97–108)
CHLORIDE SERPL-SCNC: 106 MMOL/L (ref 97–108)
CK SERPL-CCNC: 1606 U/L (ref 26–192)
CO2 SERPL-SCNC: 20 MMOL/L (ref 21–32)
CO2 SERPL-SCNC: 20 MMOL/L (ref 21–32)
CO2 SERPL-SCNC: 22 MMOL/L (ref 21–32)
CO2 SERPL-SCNC: 25 MMOL/L (ref 21–32)
COLOR UR: ABNORMAL
CREAT SERPL-MCNC: 1.03 MG/DL (ref 0.55–1.02)
CREAT SERPL-MCNC: 1.36 MG/DL (ref 0.55–1.02)
CREAT SERPL-MCNC: 1.37 MG/DL (ref 0.55–1.02)
CREAT SERPL-MCNC: 1.41 MG/DL (ref 0.55–1.02)
EKG ATRIAL RATE: 94 BPM
EKG DIAGNOSIS: NORMAL
EKG P AXIS: 53 DEGREES
EKG P-R INTERVAL: 178 MS
EKG Q-T INTERVAL: 398 MS
EKG QRS DURATION: 130 MS
EKG QTC CALCULATION (BAZETT): 497 MS
EKG R AXIS: 104 DEGREES
EKG T AXIS: 74 DEGREES
EKG VENTRICULAR RATE: 94 BPM
EPITH CASTS URNS QL MICRO: ABNORMAL /LPF
ERYTHROCYTE [DISTWIDTH] IN BLOOD BY AUTOMATED COUNT: 13.4 % (ref 11.5–14.5)
ERYTHROCYTE [DISTWIDTH] IN BLOOD BY AUTOMATED COUNT: 13.4 % (ref 11.5–14.5)
FIO2 ON VENT: 80 %
GAS FLOW.O2 SETTING OXYMISER: 16
GLOBULIN SER CALC-MCNC: 3.3 G/DL (ref 2–4)
GLOBULIN SER CALC-MCNC: 3.4 G/DL (ref 2–4)
GLUCOSE BLD STRIP.AUTO-MCNC: 168 MG/DL (ref 65–117)
GLUCOSE BLD STRIP.AUTO-MCNC: 170 MG/DL (ref 65–117)
GLUCOSE BLD STRIP.AUTO-MCNC: 253 MG/DL (ref 65–117)
GLUCOSE SERPL-MCNC: 155 MG/DL (ref 65–100)
GLUCOSE SERPL-MCNC: 208 MG/DL (ref 65–100)
GLUCOSE SERPL-MCNC: 215 MG/DL (ref 65–100)
GLUCOSE SERPL-MCNC: 237 MG/DL (ref 65–100)
GLUCOSE UR STRIP.AUTO-MCNC: 250 MG/DL
HCO3 BLDA-SCNC: 18 MMOL/L (ref 22–26)
HCT VFR BLD AUTO: 36.5 % (ref 35–47)
HCT VFR BLD AUTO: 37.6 % (ref 35–47)
HGB BLD-MCNC: 11.7 G/DL (ref 11.5–16)
HGB BLD-MCNC: 12.2 G/DL (ref 11.5–16)
HGB UR QL STRIP: ABNORMAL
KETONES UR QL STRIP.AUTO: NEGATIVE MG/DL
LACTATE SERPL-SCNC: 4.7 MMOL/L (ref 0.4–2)
LACTATE SERPL-SCNC: 5.1 MMOL/L (ref 0.4–2)
LACTATE SERPL-SCNC: 5.6 MMOL/L (ref 0.4–2)
LEUKOCYTE ESTERASE UR QL STRIP.AUTO: NEGATIVE
MAGNESIUM SERPL-MCNC: 1.4 MG/DL (ref 1.6–2.4)
MAGNESIUM SERPL-MCNC: 1.5 MG/DL (ref 1.6–2.4)
MAGNESIUM SERPL-MCNC: 1.6 MG/DL (ref 1.6–2.4)
MAGNESIUM SERPL-MCNC: 2.2 MG/DL (ref 1.6–2.4)
MCH RBC QN AUTO: 32.4 PG (ref 26–34)
MCH RBC QN AUTO: 32.7 PG (ref 26–34)
MCHC RBC AUTO-ENTMCNC: 32.1 G/DL (ref 30–36.5)
MCHC RBC AUTO-ENTMCNC: 32.4 G/DL (ref 30–36.5)
MCV RBC AUTO: 100.8 FL (ref 80–99)
MCV RBC AUTO: 101.1 FL (ref 80–99)
NITRITE UR QL STRIP.AUTO: NEGATIVE
NRBC # BLD: 0 K/UL (ref 0–0.01)
NRBC # BLD: 0 K/UL (ref 0–0.01)
NRBC BLD-RTO: 0 PER 100 WBC
NRBC BLD-RTO: 0 PER 100 WBC
PCO2 BLDA: 32 MMHG (ref 35–45)
PEEP RESPIRATORY: 8
PH BLDA: 7.38 (ref 7.35–7.45)
PH UR STRIP: 5 (ref 5–8)
PHOSPHATE SERPL-MCNC: 3.5 MG/DL (ref 2.6–4.7)
PLATELET # BLD AUTO: 183 K/UL (ref 150–400)
PLATELET # BLD AUTO: 196 K/UL (ref 150–400)
PMV BLD AUTO: 10.7 FL (ref 8.9–12.9)
PMV BLD AUTO: 11.1 FL (ref 8.9–12.9)
PO2 BLDA: 147 MMHG (ref 80–100)
POTASSIUM SERPL-SCNC: 3.4 MMOL/L (ref 3.5–5.1)
POTASSIUM SERPL-SCNC: 3.8 MMOL/L (ref 3.5–5.1)
POTASSIUM SERPL-SCNC: 3.8 MMOL/L (ref 3.5–5.1)
POTASSIUM SERPL-SCNC: 4.5 MMOL/L (ref 3.5–5.1)
PROCALCITONIN SERPL-MCNC: 0.68 NG/ML
PROT SERPL-MCNC: 6 G/DL (ref 6.4–8.2)
PROT SERPL-MCNC: 6.2 G/DL (ref 6.4–8.2)
PROT UR STRIP-MCNC: 30 MG/DL
RBC # BLD AUTO: 3.61 M/UL (ref 3.8–5.2)
RBC # BLD AUTO: 3.73 M/UL (ref 3.8–5.2)
RBC #/AREA URNS HPF: ABNORMAL /HPF (ref 0–5)
SAO2 % BLD: 99 % (ref 92–97)
SAO2% DEVICE SAO2% SENSOR NAME: ABNORMAL
SERVICE CMNT-IMP: ABNORMAL
SODIUM SERPL-SCNC: 136 MMOL/L (ref 136–145)
SODIUM SERPL-SCNC: 138 MMOL/L (ref 136–145)
SP GR UR REFRACTOMETRY: >1.03 (ref 1–1.03)
SPECIMEN SITE: ABNORMAL
URINE CULTURE IF INDICATED: ABNORMAL
UROBILINOGEN UR QL STRIP.AUTO: 0.2 EU/DL (ref 0.2–1)
VENTILATION MODE VENT: ABNORMAL
VT SETTING VENT: 400
WBC # BLD AUTO: 20.4 K/UL (ref 3.6–11)
WBC # BLD AUTO: 21.7 K/UL (ref 3.6–11)
WBC URNS QL MICRO: ABNORMAL /HPF (ref 0–4)

## 2025-01-14 PROCEDURE — 82962 GLUCOSE BLOOD TEST: CPT

## 2025-01-14 PROCEDURE — 82803 BLOOD GASES ANY COMBINATION: CPT

## 2025-01-14 PROCEDURE — 6360000002 HC RX W HCPCS: Performed by: HOSPITALIST

## 2025-01-14 PROCEDURE — 36600 WITHDRAWAL OF ARTERIAL BLOOD: CPT

## 2025-01-14 PROCEDURE — 6360000002 HC RX W HCPCS: Performed by: INTERNAL MEDICINE

## 2025-01-14 PROCEDURE — 2580000003 HC RX 258: Performed by: HOSPITALIST

## 2025-01-14 PROCEDURE — 74018 RADEX ABDOMEN 1 VIEW: CPT

## 2025-01-14 PROCEDURE — 31500 INSERT EMERGENCY AIRWAY: CPT

## 2025-01-14 PROCEDURE — 6370000000 HC RX 637 (ALT 250 FOR IP): Performed by: INTERNAL MEDICINE

## 2025-01-14 PROCEDURE — XX20X89 MONITORING OF BRAIN ELECTRICAL ACTIVITY, COMPUTER-AIDED DETECTION AND NOTIFICATION, NEW TECHNOLOGY GROUP 9: ICD-10-PCS | Performed by: PSYCHIATRY & NEUROLOGY

## 2025-01-14 PROCEDURE — 84100 ASSAY OF PHOSPHORUS: CPT

## 2025-01-14 PROCEDURE — 6370000000 HC RX 637 (ALT 250 FOR IP): Performed by: NURSE PRACTITIONER

## 2025-01-14 PROCEDURE — 2500000003 HC RX 250 WO HCPCS: Performed by: NURSE PRACTITIONER

## 2025-01-14 PROCEDURE — 80048 BASIC METABOLIC PNL TOTAL CA: CPT

## 2025-01-14 PROCEDURE — 95717 EEG PHYS/QHP 2-12 HR W/O VID: CPT | Performed by: PSYCHIATRY & NEUROLOGY

## 2025-01-14 PROCEDURE — 2580000003 HC RX 258: Performed by: NURSE PRACTITIONER

## 2025-01-14 PROCEDURE — 84145 PROCALCITONIN (PCT): CPT

## 2025-01-14 PROCEDURE — 3E033XZ INTRODUCTION OF VASOPRESSOR INTO PERIPHERAL VEIN, PERCUTANEOUS APPROACH: ICD-10-PCS | Performed by: INTERNAL MEDICINE

## 2025-01-14 PROCEDURE — 94003 VENT MGMT INPAT SUBQ DAY: CPT

## 2025-01-14 PROCEDURE — 82550 ASSAY OF CK (CPK): CPT

## 2025-01-14 PROCEDURE — 95706 EEG WO VID 2-12HR INTMT MNTR: CPT

## 2025-01-14 PROCEDURE — 36415 COLL VENOUS BLD VENIPUNCTURE: CPT

## 2025-01-14 PROCEDURE — 83605 ASSAY OF LACTIC ACID: CPT

## 2025-01-14 PROCEDURE — 81001 URINALYSIS AUTO W/SCOPE: CPT

## 2025-01-14 PROCEDURE — 2500000003 HC RX 250 WO HCPCS: Performed by: INTERNAL MEDICINE

## 2025-01-14 PROCEDURE — 6370000000 HC RX 637 (ALT 250 FOR IP): Performed by: HOSPITALIST

## 2025-01-14 PROCEDURE — 6360000002 HC RX W HCPCS: Performed by: NURSE PRACTITIONER

## 2025-01-14 PROCEDURE — 95816 EEG AWAKE AND DROWSY: CPT

## 2025-01-14 PROCEDURE — 99223 1ST HOSP IP/OBS HIGH 75: CPT | Performed by: PSYCHIATRY & NEUROLOGY

## 2025-01-14 PROCEDURE — 95816 EEG AWAKE AND DROWSY: CPT | Performed by: PSYCHIATRY & NEUROLOGY

## 2025-01-14 PROCEDURE — 83735 ASSAY OF MAGNESIUM: CPT

## 2025-01-14 PROCEDURE — 2000000000 HC ICU R&B

## 2025-01-14 PROCEDURE — 80076 HEPATIC FUNCTION PANEL: CPT

## 2025-01-14 PROCEDURE — 85027 COMPLETE CBC AUTOMATED: CPT

## 2025-01-14 RX ORDER — PROPOFOL 10 MG/ML
5-50 INJECTION, EMULSION INTRAVENOUS CONTINUOUS
Status: DISCONTINUED | OUTPATIENT
Start: 2025-01-14 | End: 2025-01-15

## 2025-01-14 RX ORDER — ENOXAPARIN SODIUM 100 MG/ML
40 INJECTION SUBCUTANEOUS DAILY
Status: DISCONTINUED | OUTPATIENT
Start: 2025-01-15 | End: 2025-01-20 | Stop reason: HOSPADM

## 2025-01-14 RX ORDER — ACETAMINOPHEN 650 MG/1
650 SUPPOSITORY RECTAL EVERY 6 HOURS PRN
Status: DISCONTINUED | OUTPATIENT
Start: 2025-01-14 | End: 2025-01-20 | Stop reason: HOSPADM

## 2025-01-14 RX ORDER — SODIUM CHLORIDE, SODIUM LACTATE, POTASSIUM CHLORIDE, AND CALCIUM CHLORIDE .6; .31; .03; .02 G/100ML; G/100ML; G/100ML; G/100ML
500 INJECTION, SOLUTION INTRAVENOUS ONCE
Status: COMPLETED | OUTPATIENT
Start: 2025-01-14 | End: 2025-01-14

## 2025-01-14 RX ORDER — ACETAMINOPHEN 325 MG/1
650 TABLET ORAL EVERY 6 HOURS PRN
Status: DISCONTINUED | OUTPATIENT
Start: 2025-01-14 | End: 2025-01-14 | Stop reason: SDUPTHER

## 2025-01-14 RX ORDER — SODIUM CHLORIDE 0.9 % (FLUSH) 0.9 %
5-40 SYRINGE (ML) INJECTION EVERY 12 HOURS SCHEDULED
Status: DISCONTINUED | OUTPATIENT
Start: 2025-01-14 | End: 2025-01-20 | Stop reason: HOSPADM

## 2025-01-14 RX ORDER — ASPIRIN 300 MG/1
300 SUPPOSITORY RECTAL DAILY
Status: DISCONTINUED | OUTPATIENT
Start: 2025-01-14 | End: 2025-01-15

## 2025-01-14 RX ORDER — ENOXAPARIN SODIUM 100 MG/ML
30 INJECTION SUBCUTANEOUS DAILY
Status: DISCONTINUED | OUTPATIENT
Start: 2025-01-14 | End: 2025-01-14

## 2025-01-14 RX ORDER — BISACODYL 10 MG
10 SUPPOSITORY, RECTAL RECTAL DAILY
Status: DISCONTINUED | OUTPATIENT
Start: 2025-01-14 | End: 2025-01-16 | Stop reason: ALTCHOICE

## 2025-01-14 RX ORDER — SODIUM CHLORIDE 9 MG/ML
INJECTION, SOLUTION INTRAVENOUS PRN
Status: DISCONTINUED | OUTPATIENT
Start: 2025-01-14 | End: 2025-01-20 | Stop reason: HOSPADM

## 2025-01-14 RX ORDER — SODIUM CHLORIDE, SODIUM LACTATE, POTASSIUM CHLORIDE, CALCIUM CHLORIDE 600; 310; 30; 20 MG/100ML; MG/100ML; MG/100ML; MG/100ML
INJECTION, SOLUTION INTRAVENOUS CONTINUOUS
Status: DISCONTINUED | OUTPATIENT
Start: 2025-01-14 | End: 2025-01-16 | Stop reason: ALTCHOICE

## 2025-01-14 RX ORDER — INSULIN LISPRO 100 [IU]/ML
0-8 INJECTION, SOLUTION INTRAVENOUS; SUBCUTANEOUS EVERY 6 HOURS
Status: DISCONTINUED | OUTPATIENT
Start: 2025-01-14 | End: 2025-01-20 | Stop reason: HOSPADM

## 2025-01-14 RX ORDER — DEXTROSE MONOHYDRATE 100 MG/ML
INJECTION, SOLUTION INTRAVENOUS CONTINUOUS PRN
Status: DISCONTINUED | OUTPATIENT
Start: 2025-01-14 | End: 2025-01-20 | Stop reason: HOSPADM

## 2025-01-14 RX ORDER — ONDANSETRON 2 MG/ML
4 INJECTION INTRAMUSCULAR; INTRAVENOUS EVERY 6 HOURS PRN
Status: DISCONTINUED | OUTPATIENT
Start: 2025-01-14 | End: 2025-01-20 | Stop reason: HOSPADM

## 2025-01-14 RX ORDER — SODIUM CHLORIDE 0.9 % (FLUSH) 0.9 %
5-40 SYRINGE (ML) INJECTION PRN
Status: DISCONTINUED | OUTPATIENT
Start: 2025-01-14 | End: 2025-01-20 | Stop reason: HOSPADM

## 2025-01-14 RX ORDER — ONDANSETRON 4 MG/1
4 TABLET, ORALLY DISINTEGRATING ORAL EVERY 8 HOURS PRN
Status: DISCONTINUED | OUTPATIENT
Start: 2025-01-14 | End: 2025-01-20 | Stop reason: HOSPADM

## 2025-01-14 RX ORDER — POTASSIUM CHLORIDE 7.45 MG/ML
10 INJECTION INTRAVENOUS
Status: COMPLETED | OUTPATIENT
Start: 2025-01-14 | End: 2025-01-14

## 2025-01-14 RX ORDER — MAGNESIUM SULFATE IN WATER 40 MG/ML
2000 INJECTION, SOLUTION INTRAVENOUS ONCE
Status: COMPLETED | OUTPATIENT
Start: 2025-01-14 | End: 2025-01-14

## 2025-01-14 RX ORDER — POLYETHYLENE GLYCOL 3350 17 G/17G
17 POWDER, FOR SOLUTION ORAL DAILY PRN
Status: DISCONTINUED | OUTPATIENT
Start: 2025-01-14 | End: 2025-01-20 | Stop reason: HOSPADM

## 2025-01-14 RX ORDER — GLUCAGON 1 MG/ML
1 KIT INJECTION PRN
Status: DISCONTINUED | OUTPATIENT
Start: 2025-01-14 | End: 2025-01-20 | Stop reason: HOSPADM

## 2025-01-14 RX ORDER — ACETAMINOPHEN 500 MG
1000 TABLET ORAL EVERY 8 HOURS SCHEDULED
Status: DISCONTINUED | OUTPATIENT
Start: 2025-01-14 | End: 2025-01-20 | Stop reason: HOSPADM

## 2025-01-14 RX ORDER — ACETAMINOPHEN 650 MG/1
650 SUPPOSITORY RECTAL EVERY 6 HOURS PRN
Status: DISCONTINUED | OUTPATIENT
Start: 2025-01-14 | End: 2025-01-14 | Stop reason: SDUPTHER

## 2025-01-14 RX ORDER — NOREPINEPHRINE BITARTRATE 0.06 MG/ML
.5-2 INJECTION, SOLUTION INTRAVENOUS CONTINUOUS
Status: DISCONTINUED | OUTPATIENT
Start: 2025-01-14 | End: 2025-01-15

## 2025-01-14 RX ORDER — FENTANYL CITRATE 50 UG/ML
50 INJECTION, SOLUTION INTRAMUSCULAR; INTRAVENOUS
Status: DISCONTINUED | OUTPATIENT
Start: 2025-01-14 | End: 2025-01-17

## 2025-01-14 RX ORDER — CHLORHEXIDINE GLUCONATE ORAL RINSE 1.2 MG/ML
15 SOLUTION DENTAL 2 TIMES DAILY
Status: DISCONTINUED | OUTPATIENT
Start: 2025-01-14 | End: 2025-01-16 | Stop reason: ALTCHOICE

## 2025-01-14 RX ORDER — ACETAMINOPHEN 325 MG/1
650 TABLET ORAL EVERY 6 HOURS PRN
Status: DISCONTINUED | OUTPATIENT
Start: 2025-01-14 | End: 2025-01-20 | Stop reason: HOSPADM

## 2025-01-14 RX ADMIN — TICAGRELOR 90 MG: 90 TABLET ORAL at 10:53

## 2025-01-14 RX ADMIN — BISACODYL 10 MG: 10 SUPPOSITORY RECTAL at 12:13

## 2025-01-14 RX ADMIN — Medication 1 AMPULE: at 20:09

## 2025-01-14 RX ADMIN — NOREPINEPHRINE BITARTRATE 4 MCG/MIN: 64 SOLUTION INTRAVENOUS at 01:02

## 2025-01-14 RX ADMIN — PROPOFOL 20 MCG/KG/MIN: 10 INJECTION, EMULSION INTRAVENOUS at 05:31

## 2025-01-14 RX ADMIN — CHLORHEXIDINE GLUCONATE 15 ML: 1.2 RINSE ORAL at 20:10

## 2025-01-14 RX ADMIN — ACETAMINOPHEN 1000 MG: 500 TABLET, FILM COATED ORAL at 20:09

## 2025-01-14 RX ADMIN — SODIUM CHLORIDE, POTASSIUM CHLORIDE, SODIUM LACTATE AND CALCIUM CHLORIDE 500 ML: 600; 310; 30; 20 INJECTION, SOLUTION INTRAVENOUS at 03:52

## 2025-01-14 RX ADMIN — POTASSIUM CHLORIDE 10 MEQ: 7.46 INJECTION, SOLUTION INTRAVENOUS at 13:43

## 2025-01-14 RX ADMIN — Medication 1 AMPULE: at 08:50

## 2025-01-14 RX ADMIN — INSULIN LISPRO 4 UNITS: 100 INJECTION, SOLUTION INTRAVENOUS; SUBCUTANEOUS at 01:04

## 2025-01-14 RX ADMIN — SODIUM CHLORIDE, PRESERVATIVE FREE 40 MG: 5 INJECTION INTRAVENOUS at 08:45

## 2025-01-14 RX ADMIN — TICAGRELOR 90 MG: 90 TABLET ORAL at 20:09

## 2025-01-14 RX ADMIN — ASPIRIN 300 MG: 300 SUPPOSITORY RECTAL at 12:13

## 2025-01-14 RX ADMIN — INSULIN LISPRO 2 UNITS: 100 INJECTION, SOLUTION INTRAVENOUS; SUBCUTANEOUS at 06:16

## 2025-01-14 RX ADMIN — SODIUM CHLORIDE, PRESERVATIVE FREE 10 ML: 5 INJECTION INTRAVENOUS at 20:10

## 2025-01-14 RX ADMIN — SODIUM CHLORIDE, PRESERVATIVE FREE 10 ML: 5 INJECTION INTRAVENOUS at 09:55

## 2025-01-14 RX ADMIN — POTASSIUM CHLORIDE 10 MEQ: 7.46 INJECTION, SOLUTION INTRAVENOUS at 16:54

## 2025-01-14 RX ADMIN — SODIUM CHLORIDE, POTASSIUM CHLORIDE, SODIUM LACTATE AND CALCIUM CHLORIDE 500 ML: 600; 310; 30; 20 INJECTION, SOLUTION INTRAVENOUS at 05:38

## 2025-01-14 RX ADMIN — SODIUM CHLORIDE, POTASSIUM CHLORIDE, SODIUM LACTATE AND CALCIUM CHLORIDE 500 ML: 600; 310; 30; 20 INJECTION, SOLUTION INTRAVENOUS at 01:07

## 2025-01-14 RX ADMIN — POTASSIUM CHLORIDE 10 MEQ: 7.46 INJECTION, SOLUTION INTRAVENOUS at 17:57

## 2025-01-14 RX ADMIN — ACETAMINOPHEN 650 MG: 650 SUPPOSITORY RECTAL at 12:13

## 2025-01-14 RX ADMIN — SODIUM CHLORIDE, POTASSIUM CHLORIDE, SODIUM LACTATE AND CALCIUM CHLORIDE: 600; 310; 30; 20 INJECTION, SOLUTION INTRAVENOUS at 06:13

## 2025-01-14 RX ADMIN — SODIUM CHLORIDE, POTASSIUM CHLORIDE, SODIUM LACTATE AND CALCIUM CHLORIDE: 600; 310; 30; 20 INJECTION, SOLUTION INTRAVENOUS at 04:54

## 2025-01-14 RX ADMIN — ENOXAPARIN SODIUM 30 MG: 100 INJECTION SUBCUTANEOUS at 08:44

## 2025-01-14 RX ADMIN — PIPERACILLIN AND TAZOBACTAM 4500 MG: 4; .5 INJECTION, POWDER, LYOPHILIZED, FOR SOLUTION INTRAVENOUS; PARENTERAL at 08:55

## 2025-01-14 RX ADMIN — MAGNESIUM SULFATE HEPTAHYDRATE 2000 MG: 40 INJECTION, SOLUTION INTRAVENOUS at 12:40

## 2025-01-14 RX ADMIN — SODIUM CHLORIDE, POTASSIUM CHLORIDE, SODIUM LACTATE AND CALCIUM CHLORIDE: 600; 310; 30; 20 INJECTION, SOLUTION INTRAVENOUS at 20:11

## 2025-01-14 RX ADMIN — SODIUM CHLORIDE: 900 INJECTION INTRAVENOUS at 09:59

## 2025-01-14 RX ADMIN — CHLORHEXIDINE GLUCONATE 15 ML: 1.2 RINSE ORAL at 08:48

## 2025-01-14 RX ADMIN — PIPERACILLIN AND TAZOBACTAM 3375 MG: 3; .375 INJECTION, POWDER, LYOPHILIZED, FOR SOLUTION INTRAVENOUS at 22:52

## 2025-01-14 RX ADMIN — POTASSIUM CHLORIDE 10 MEQ: 7.46 INJECTION, SOLUTION INTRAVENOUS at 12:37

## 2025-01-14 RX ADMIN — PIPERACILLIN AND TAZOBACTAM 3375 MG: 3; .375 INJECTION, POWDER, LYOPHILIZED, FOR SOLUTION INTRAVENOUS at 14:52

## 2025-01-14 ASSESSMENT — PULMONARY FUNCTION TESTS
PIF_VALUE: 18
PIF_VALUE: 27
PIF_VALUE: 23
PIF_VALUE: 19
PIF_VALUE: 25

## 2025-01-14 NOTE — CONSULTS
[facility]  [address], [place] [id number] [phone]  [email or web address]       CARDIOLOGY CONSULTATION       Date of  Admission: 1/13/2025  9:41 PM     Admission type:Emergency      Attending Provider: No att. providers found  Cardiology Provider:     CC/REASON FOR CONSULT: Cardiac arrest     Subjective:     Elsi Petersen is a 74 y.o. female admitted for Cardiac arrest [I46.9]  STEMI (ST elevation myocardial infarction) (HCC) [I21.3]  ST elevation myocardial infarction (STEMI), unspecified artery (HCC) [I21.3].Patient went down at home in front of . Witnessed cardiac arrest. Per  no recent complaints. Fairly active at baseline. No previous cardiac history or work up. Found to be in V tach by EMS. Inferior STEMI on EKG in ER.       Patient Active Problem List    Diagnosis Date Noted    Cardiac arrest 01/13/2025    STEMI (ST elevation myocardial infarction) (HCC) 01/13/2025    Severe obesity 07/20/2019      Pily Nguyễn MD  History reviewed. No pertinent past medical history.   Social History     Socioeconomic History    Marital status:      Spouse name: None    Number of children: None    Years of education: None    Highest education level: None   Tobacco Use    Smoking status: Never    Smokeless tobacco: Never     Allergies   Allergen Reactions    Codeine Nausea And Vomiting    Sulfa Antibiotics Rash      History reviewed. No pertinent family history.   Current Facility-Administered Medications   Medication Dose Route Frequency    dexmedeTOMIDine (PRECEDEX) 400 mcg in sodium chloride 0.9 % 100 mL infusion  0.1-1.5 mcg/kg/hr IntraVENous Continuous    DOPamine (INTROPIN) 800 mg in dextrose 5 % 250 mL infusion  1-20 mcg/kg/min IntraVENous Continuous    furosemide (LASIX) injection 40 mg  40 mg IntraVENous NOW        Review of Symptoms:   11 systems reviewed, negative other than as stated in the HPI        Objective:      Vitals:    
Rectal Q6H PRN    insulin lispro (HUMALOG,ADMELOG) injection vial 0-8 Units  0-8 Units SubCUTAneous Q6H    glucose chewable tablet 16 g  4 tablet Oral PRN    dextrose bolus 10% 125 mL  125 mL IntraVENous PRN    Or    dextrose bolus 10% 250 mL  250 mL IntraVENous PRN    glucagon injection 1 mg  1 mg SubCUTAneous PRN    dextrose 10 % infusion   IntraVENous Continuous PRN    norepinephrine (LEVOPHED) 16 mg in sodium chloride 0.9 % 250 mL infusion  0.5-20 mcg/min IntraVENous Continuous    pantoprazole (PROTONIX) 40 mg in sodium chloride (PF) 0.9 % 10 mL injection  40 mg IntraVENous Daily    acetaminophen (TYLENOL) tablet 650 mg  650 mg Oral Q6H PRN    Or    acetaminophen (TYLENOL) suppository 650 mg  650 mg Rectal Q6H PRN    alcohol 62% (NOZIN) nasal  1 ampule  1 ampule Nasal BID    lactated ringers infusion   IntraVENous Continuous    sodium chloride flush 0.9 % injection 5-40 mL  5-40 mL IntraVENous 2 times per day    sodium chloride flush 0.9 % injection 5-40 mL  5-40 mL IntraVENous PRN    0.9 % sodium chloride infusion   IntraVENous PRN    acetaminophen (TYLENOL) tablet 650 mg  650 mg Oral Q4H PRN    aspirin chewable tablet 81 mg  81 mg Orogastric Daily    ticagrelor (BRILINTA) tablet 90 mg  90 mg Orogastric BID       Review of Systems:      [x]Unable to obtain  ROS due to  []mental status change  [x]sedated   [x]intubated    Objective:     BP (!) 96/56   Pulse 63   Temp 98.6 °F (37 °C)   Resp 16   Ht 1.676 m (5' 6\")   Wt 88.6 kg (195 lb 5.2 oz)   SpO2 100%   BMI 31.53 kg/m²     Physical Exam:      Neurological examination    Neck: no carotid bruits  Lungs: clear to auscultation  Heart:  no murmurs, regular rate  Lower extremity: no edema    Language: coma    Cranial nerves:   Perrrla  + corneal reflex  Fundoscopic examination attempted, but difficult to visualize fundus  Facial sensation/motor: symmetric grimace to noxious stimulation  Corneal reflex intact  Oculocephalic reflex intact      Motor:

## 2025-01-14 NOTE — ED NOTES
Verbal MD Beard to go to CT at this time. Pt in CT with primary RN, documenting RN, and respiratory. Awaiting arrival of Cardiology

## 2025-01-14 NOTE — INTERDISCIPLINARY ROUNDS
Critical care interdisciplinary rounds today.  Following members present: Case Management, , Clinical Lead, Diabetes Team, Nursing, Nutrition, Pharmacy, and Physician. Family invited to participate.  Plan of care discussed.  See clinical pathway for plan of care and interventions and desired outcomes.    Pt. Remains intubated, Weaned sedation off this AM.     Low dose Levophed in use.     Horton in place for strict I/O's. Keep for today per Dr. Smith.

## 2025-01-14 NOTE — H&P
CRITICAL CARE NOTE      Name: Elsi Petersen   : 1950   MRN: [id number]   Date: 2025      REASON FOR ICU ADMISSION: S/p cardiac arrest    PRINCIPAL ICU DIAGNOSIS   S/p cardiopulmonary arrest  Cardiogenic shock  Inferior STEMI  Concern for possible anoxic brain injury  Acute kidney injury    BRIEF PATIENT SUMMARY   75 y/o female PMHx of hypertension who presented to ED for out-of-hospital cardiac arrest.  Per report patient had collapsed at home,  started CPR.  Initial rhythm PEA per EMS, obtained ROSC after approximately 10 minutes. Subsequently went into V. tach arrest was given 1 mg of epi with ROSC.  On ED arrival, no withdrawal to tactile stimuli,.  CT head-negative.  EKG demonstrated inferior STEMI.  CT head-negative, CXR-pulmonary edema. taken emergently to the Cath Lab demonstrating distal LAD lesion, s/p angioplasty, no stent was placed due to distal location of lesion and small size of vessel.      COMPREHENSIVE ASSESSMENT & PLAN:SYSTEM BASED   S/p out-of-hospital cardiopulmonary arrest   Cardiogenic shock  Witnessed by patient's , CPR performed PEA initial rhythm on EMS arrival, ROSC obtained subsequently went into V. tach arrest, obtain ROSC a second time with 1 mg of epi  Vasopressors to keep MAP 65 and above for adequate tissue perfusion.   Monitor lactic as a surrogate for tissue perfusion    Anoxic brain injury in setting of out-of-hospital cardiac arrest??  Initial CT head-negative,   Rapid EEG-pending  Neurology consult in a.m.  Targeted temperature management  Continuous Temperature monitoring for goal normothermia (35.3 °C to 37.3 °C)  Prevent fever  (> 37.7 °C)  for 72 hours, antipyretics +/- external cooling device as indicated  Avoid active rewarming to achieve normothermia    Inferior STEMI  S/p angioplasty, no stent placed due to distal location of lesion and small size vessel  Continue aspirin and Brilinta  Echo-pending    Acute respiratory failure in setting

## 2025-01-14 NOTE — ED PROVIDER NOTES
[facility] CARDIAC CATH/EP LAB  EMERGENCY DEPARTMENT ENCOUNTER       Pt Name: Elsi Petersen  MRN: [id number]  Birthdate 1950  Date of evaluation: 1/13/2025  Provider: Adiel Beard DO   PCP: Pily Nguyễn MD  Note Started: 11:35 PM EST 1/13/25     CHIEF COMPLAINT       Chief Complaint   Patient presents with    Cardiac Arrest        HISTORY OF PRESENT ILLNESS: 1 or more elements      History From: EMS, History limited by: Unresponsive      Elsi Petersen is a 74 y.o. female presents to the emergency department following cardiac arrest.       Please See MDM for Additional Details of the HPI/PMH  Nursing Notes were all reviewed and agreed with or any disagreements were addressed in the HPI.     REVIEW OF SYSTEMS        Positives and Pertinent negatives as per HPI.    PAST HISTORY     Past Medical History:  History reviewed. No pertinent past medical history.    Past Surgical History:  Past Surgical History:   Procedure Laterality Date    CHOLECYSTECTOMY      ORTHOPEDIC SURGERY      back and foot       Family History:  History reviewed. No pertinent family history.    Social History:  Social History     Tobacco Use    Smoking status: Never    Smokeless tobacco: Never       Allergies:  Allergies   Allergen Reactions    Codeine Nausea And Vomiting    Sulfa Antibiotics Rash       CURRENT MEDICATIONS      Current Discharge Medication List        CONTINUE these medications which have NOT CHANGED    Details   amLODIPine (NORVASC) 5 MG tablet Take by mouth daily      vitamin D (CHOLECALCIFEROL) 25 MCG (1000 UT) TABS tablet Take by mouth daily      guaiFENesin (MUCINEX) 600 MG extended release tablet Take by mouth 2 times daily      omeprazole (PRILOSEC) 20 MG delayed release capsule Take by mouth daily             SCREENINGS               No data recorded            PHYSICAL EXAM      ED Triage Vitals   Encounter Vitals Group      BP 01/13/25 2147 96/66      Systolic BP Percentile --

## 2025-01-14 NOTE — PROCEDURES
EEG REPORT    Patient Name: Elsi Petersen  : 1950  Age: 74 y.o.    Ordering physician: Dr. Smith  Date of EE2025  01:43 - 03:51  Diagnosis: unresponsive episode  Interpreting physician: Gabriele Freeman D.O. FAAN    Procedure: EEG    CLINICAL INDICATION: The patient is a 74 y.o. female who is being evaluated for baseline electro cerebral activities and to rule out seizure focus.      Current Facility-Administered Medications   Medication Dose Route Frequency    propofol infusion  5-50 mcg/kg/min IntraVENous Continuous    sodium chloride flush 0.9 % injection 5-40 mL  5-40 mL IntraVENous 2 times per day    sodium chloride flush 0.9 % injection 5-40 mL  5-40 mL IntraVENous PRN    0.9 % sodium chloride infusion   IntraVENous PRN    enoxaparin Sodium (LOVENOX) injection 30 mg  30 mg SubCUTAneous Daily    ondansetron (ZOFRAN-ODT) disintegrating tablet 4 mg  4 mg Oral Q8H PRN    Or    ondansetron (ZOFRAN) injection 4 mg  4 mg IntraVENous Q6H PRN    polyethylene glycol (GLYCOLAX) packet 17 g  17 g Oral Daily PRN    acetaminophen (TYLENOL) tablet 650 mg  650 mg Oral Q6H PRN    Or    acetaminophen (TYLENOL) suppository 650 mg  650 mg Rectal Q6H PRN    insulin lispro (HUMALOG,ADMELOG) injection vial 0-8 Units  0-8 Units SubCUTAneous Q6H    glucose chewable tablet 16 g  4 tablet Oral PRN    dextrose bolus 10% 125 mL  125 mL IntraVENous PRN    Or    dextrose bolus 10% 250 mL  250 mL IntraVENous PRN    glucagon injection 1 mg  1 mg SubCUTAneous PRN    dextrose 10 % infusion   IntraVENous Continuous PRN    norepinephrine (LEVOPHED) 16 mg in sodium chloride 0.9 % 250 mL infusion  0.5-20 mcg/min IntraVENous Continuous    pantoprazole (PROTONIX) 40 mg in sodium chloride (PF) 0.9 % 10 mL injection  40 mg IntraVENous Daily    acetaminophen (TYLENOL) tablet 650 mg  650 mg Oral Q6H PRN    Or    acetaminophen (TYLENOL) suppository 650 mg  650 mg Rectal Q6H PRN    alcohol 62% (NOZIN) nasal  1 ampule  1 ampule 
global cerebral dysfunction suggestive of a toxic-metabolic encephalopathies of nonspecific etiology or in the setting of underlying dementia. There is no focal asymmetry, seizures or epileptiform discharges seen.    Clinical correlation is recommended             Gabriele Freeman D.O. FAAN

## 2025-01-14 NOTE — ED TRIAGE NOTES
Pt. Arrives as a cardiac arrest from home, pt LKW was 9pm when EMS was called for pt being unresponsive. EMS ran patient as a cardiac arrest; 3 epi given PTA and pt found to be in a rhythm of PEA and V-tach, then lost pulses again. ROSC en route to our facility, pt rhythm on monitor flagging EMS for a STEMI.     Pt. Maintaining at this time, HR 88, B/P 96/66, 96% pt being mechanically vented    150mg of amio given at 21:42    STEMI called at 21:44

## 2025-01-14 NOTE — ED NOTES
Pt. Returned from CT; cardiology in room speaking with family at this time  Pt. Saturation reading low, RT at bedside titrating settings    Pt. Now at 91%, physician aware

## 2025-01-15 ENCOUNTER — APPOINTMENT (OUTPATIENT)
Facility: HOSPITAL | Age: 75
DRG: 250 | End: 2025-01-15
Attending: INTERNAL MEDICINE
Payer: MEDICARE

## 2025-01-15 LAB
ALBUMIN SERPL-MCNC: 2.6 G/DL (ref 3.5–5)
ALBUMIN/GLOB SERPL: 0.7 (ref 1.1–2.2)
ALP SERPL-CCNC: 50 U/L (ref 45–117)
ALT SERPL-CCNC: 76 U/L (ref 12–78)
ANION GAP SERPL CALC-SCNC: 4 MMOL/L (ref 2–12)
AST SERPL-CCNC: 114 U/L (ref 15–37)
BILIRUB DIRECT SERPL-MCNC: 0.3 MG/DL (ref 0–0.2)
BILIRUB SERPL-MCNC: 1.1 MG/DL (ref 0.2–1)
BUN SERPL-MCNC: 21 MG/DL (ref 6–20)
BUN/CREAT SERPL: 19 (ref 12–20)
CALCIUM SERPL-MCNC: 8.2 MG/DL (ref 8.5–10.1)
CHLORIDE SERPL-SCNC: 105 MMOL/L (ref 97–108)
CO2 SERPL-SCNC: 28 MMOL/L (ref 21–32)
CREAT SERPL-MCNC: 1.13 MG/DL (ref 0.55–1.02)
ECHO AV MEAN GRADIENT: 5 MMHG
ECHO AV MEAN VELOCITY: 1 M/S
ECHO AV PEAK GRADIENT: 7 MMHG
ECHO AV PEAK VELOCITY: 1.3 M/S
ECHO AV VELOCITY RATIO: 0.92
ECHO AV VTI: 30.8 CM
ECHO BSA: 2.03 M2
ECHO LV EF PHYSICIAN: 60 %
ECHO LVOT AV VTI INDEX: 0.9
ECHO LVOT MEAN GRADIENT: 4 MMHG
ECHO LVOT PEAK GRADIENT: 6 MMHG
ECHO LVOT PEAK VELOCITY: 1.2 M/S
ECHO LVOT VTI: 27.8 CM
ECHO MV A VELOCITY: 0.82 M/S
ECHO MV E DECELERATION TIME (DT): 183.8 MS
ECHO MV E VELOCITY: 0.54 M/S
ECHO MV E/A RATIO: 0.66
ECHO MV LVOT VTI INDEX: 0.89
ECHO MV MAX VELOCITY: 1.1 M/S
ECHO MV MEAN GRADIENT: 2 MMHG
ECHO MV MEAN VELOCITY: 0.7 M/S
ECHO MV PEAK GRADIENT: 5 MMHG
ECHO MV REGURGITANT PEAK GRADIENT: 0 MMHG
ECHO MV REGURGITANT PEAK VELOCITY: 0 M/S
ECHO MV VTI: 24.8 CM
ERYTHROCYTE [DISTWIDTH] IN BLOOD BY AUTOMATED COUNT: 13.9 % (ref 11.5–14.5)
GLOBULIN SER CALC-MCNC: 3.5 G/DL (ref 2–4)
GLUCOSE BLD STRIP.AUTO-MCNC: 157 MG/DL (ref 65–117)
GLUCOSE BLD STRIP.AUTO-MCNC: 160 MG/DL (ref 65–117)
GLUCOSE BLD STRIP.AUTO-MCNC: 165 MG/DL (ref 65–117)
GLUCOSE BLD STRIP.AUTO-MCNC: 175 MG/DL (ref 65–117)
GLUCOSE BLD STRIP.AUTO-MCNC: 184 MG/DL (ref 65–117)
GLUCOSE BLD STRIP.AUTO-MCNC: 195 MG/DL (ref 65–117)
GLUCOSE SERPL-MCNC: 165 MG/DL (ref 65–100)
HCT VFR BLD AUTO: 30 % (ref 35–47)
HGB BLD-MCNC: 9.9 G/DL (ref 11.5–16)
MAGNESIUM SERPL-MCNC: 2.1 MG/DL (ref 1.6–2.4)
MCH RBC QN AUTO: 32.5 PG (ref 26–34)
MCHC RBC AUTO-ENTMCNC: 33 G/DL (ref 30–36.5)
MCV RBC AUTO: 98.4 FL (ref 80–99)
NRBC # BLD: 0 K/UL (ref 0–0.01)
NRBC BLD-RTO: 0 PER 100 WBC
PHOSPHATE SERPL-MCNC: 3.3 MG/DL (ref 2.6–4.7)
PLATELET # BLD AUTO: 144 K/UL (ref 150–400)
PMV BLD AUTO: 10.9 FL (ref 8.9–12.9)
POTASSIUM SERPL-SCNC: 4.2 MMOL/L (ref 3.5–5.1)
PROT SERPL-MCNC: 6.1 G/DL (ref 6.4–8.2)
RBC # BLD AUTO: 3.05 M/UL (ref 3.8–5.2)
SERVICE CMNT-IMP: ABNORMAL
SODIUM SERPL-SCNC: 137 MMOL/L (ref 136–145)
WBC # BLD AUTO: 14 K/UL (ref 3.6–11)

## 2025-01-15 PROCEDURE — 6360000002 HC RX W HCPCS: Performed by: NURSE PRACTITIONER

## 2025-01-15 PROCEDURE — 6360000002 HC RX W HCPCS: Performed by: HOSPITALIST

## 2025-01-15 PROCEDURE — 6370000000 HC RX 637 (ALT 250 FOR IP): Performed by: NURSE PRACTITIONER

## 2025-01-15 PROCEDURE — 2500000003 HC RX 250 WO HCPCS: Performed by: HOSPITALIST

## 2025-01-15 PROCEDURE — 80048 BASIC METABOLIC PNL TOTAL CA: CPT

## 2025-01-15 PROCEDURE — 84100 ASSAY OF PHOSPHORUS: CPT

## 2025-01-15 PROCEDURE — 83735 ASSAY OF MAGNESIUM: CPT

## 2025-01-15 PROCEDURE — 2500000003 HC RX 250 WO HCPCS: Performed by: INTERNAL MEDICINE

## 2025-01-15 PROCEDURE — C8929 TTE W OR WO FOL WCON,DOPPLER: HCPCS

## 2025-01-15 PROCEDURE — 80076 HEPATIC FUNCTION PANEL: CPT

## 2025-01-15 PROCEDURE — 94003 VENT MGMT INPAT SUBQ DAY: CPT

## 2025-01-15 PROCEDURE — 2500000003 HC RX 250 WO HCPCS: Performed by: NURSE PRACTITIONER

## 2025-01-15 PROCEDURE — 85027 COMPLETE CBC AUTOMATED: CPT

## 2025-01-15 PROCEDURE — 2580000003 HC RX 258: Performed by: HOSPITALIST

## 2025-01-15 PROCEDURE — 2580000003 HC RX 258: Performed by: NURSE PRACTITIONER

## 2025-01-15 PROCEDURE — 36415 COLL VENOUS BLD VENIPUNCTURE: CPT

## 2025-01-15 PROCEDURE — 82962 GLUCOSE BLOOD TEST: CPT

## 2025-01-15 PROCEDURE — 99232 SBSQ HOSP IP/OBS MODERATE 35: CPT | Performed by: PSYCHIATRY & NEUROLOGY

## 2025-01-15 PROCEDURE — 2000000000 HC ICU R&B

## 2025-01-15 PROCEDURE — 6360000004 HC RX CONTRAST MEDICATION: Performed by: INTERNAL MEDICINE

## 2025-01-15 PROCEDURE — 6370000000 HC RX 637 (ALT 250 FOR IP): Performed by: HOSPITALIST

## 2025-01-15 PROCEDURE — 6370000000 HC RX 637 (ALT 250 FOR IP): Performed by: INTERNAL MEDICINE

## 2025-01-15 RX ORDER — NOREPINEPHRINE BITARTRATE 0.06 MG/ML
1-100 INJECTION, SOLUTION INTRAVENOUS CONTINUOUS
Status: DISCONTINUED | OUTPATIENT
Start: 2025-01-15 | End: 2025-01-17

## 2025-01-15 RX ORDER — CASTOR OIL AND BALSAM, PERU 788; 87 MG/G; MG/G
OINTMENT TOPICAL 2 TIMES DAILY
Status: DISCONTINUED | OUTPATIENT
Start: 2025-01-15 | End: 2025-01-20 | Stop reason: HOSPADM

## 2025-01-15 RX ORDER — DEXMEDETOMIDINE HYDROCHLORIDE 4 UG/ML
.1-1.5 INJECTION, SOLUTION INTRAVENOUS CONTINUOUS
Status: DISCONTINUED | OUTPATIENT
Start: 2025-01-15 | End: 2025-01-17

## 2025-01-15 RX ADMIN — NOREPINEPHRINE BITARTRATE 5 MCG/MIN: 64 SOLUTION INTRAVENOUS at 18:15

## 2025-01-15 RX ADMIN — PIPERACILLIN AND TAZOBACTAM 3375 MG: 3; .375 INJECTION, POWDER, LYOPHILIZED, FOR SOLUTION INTRAVENOUS at 22:02

## 2025-01-15 RX ADMIN — SODIUM CHLORIDE, PRESERVATIVE FREE 10 ML: 5 INJECTION INTRAVENOUS at 09:23

## 2025-01-15 RX ADMIN — TICAGRELOR 90 MG: 90 TABLET ORAL at 21:34

## 2025-01-15 RX ADMIN — Medication 1 AMPULE: at 09:24

## 2025-01-15 RX ADMIN — ENOXAPARIN SODIUM 40 MG: 100 INJECTION SUBCUTANEOUS at 09:22

## 2025-01-15 RX ADMIN — ACETAMINOPHEN 1000 MG: 500 TABLET, FILM COATED ORAL at 06:13

## 2025-01-15 RX ADMIN — PIPERACILLIN AND TAZOBACTAM 3375 MG: 3; .375 INJECTION, POWDER, LYOPHILIZED, FOR SOLUTION INTRAVENOUS at 06:14

## 2025-01-15 RX ADMIN — SODIUM CHLORIDE, PRESERVATIVE FREE 10 ML: 5 INJECTION INTRAVENOUS at 21:36

## 2025-01-15 RX ADMIN — PERFLUTREN 1.5 ML: 6.52 INJECTION, SUSPENSION INTRAVENOUS at 11:45

## 2025-01-15 RX ADMIN — ACETAMINOPHEN 1000 MG: 500 TABLET, FILM COATED ORAL at 21:39

## 2025-01-15 RX ADMIN — TICAGRELOR 90 MG: 90 TABLET ORAL at 09:23

## 2025-01-15 RX ADMIN — SODIUM CHLORIDE, PRESERVATIVE FREE 40 MG: 5 INJECTION INTRAVENOUS at 09:22

## 2025-01-15 RX ADMIN — Medication: at 23:00

## 2025-01-15 RX ADMIN — SODIUM CHLORIDE, POTASSIUM CHLORIDE, SODIUM LACTATE AND CALCIUM CHLORIDE: 600; 310; 30; 20 INJECTION, SOLUTION INTRAVENOUS at 23:59

## 2025-01-15 RX ADMIN — ACETAMINOPHEN 650 MG: 650 SUPPOSITORY RECTAL at 17:15

## 2025-01-15 RX ADMIN — PIPERACILLIN AND TAZOBACTAM 3375 MG: 3; .375 INJECTION, POWDER, LYOPHILIZED, FOR SOLUTION INTRAVENOUS at 14:11

## 2025-01-15 RX ADMIN — CHLORHEXIDINE GLUCONATE 15 ML: 1.2 RINSE ORAL at 21:36

## 2025-01-15 RX ADMIN — INSULIN LISPRO 2 UNITS: 100 INJECTION, SOLUTION INTRAVENOUS; SUBCUTANEOUS at 06:47

## 2025-01-15 RX ADMIN — Medication 1 AMPULE: at 21:35

## 2025-01-15 RX ADMIN — CHLORHEXIDINE GLUCONATE 15 ML: 1.2 RINSE ORAL at 09:22

## 2025-01-15 RX ADMIN — SODIUM CHLORIDE, POTASSIUM CHLORIDE, SODIUM LACTATE AND CALCIUM CHLORIDE: 600; 310; 30; 20 INJECTION, SOLUTION INTRAVENOUS at 09:45

## 2025-01-15 RX ADMIN — DEXMEDETOMIDINE HYDROCHLORIDE 0.2 MCG/KG/HR: 4 INJECTION, SOLUTION INTRAVENOUS at 12:41

## 2025-01-15 RX ADMIN — ASPIRIN 300 MG: 300 SUPPOSITORY RECTAL at 09:23

## 2025-01-15 ASSESSMENT — PULMONARY FUNCTION TESTS
PIF_VALUE: 10
PIF_VALUE: 11
PIF_VALUE: 23
PIF_VALUE: 21
PIF_VALUE: 22
PIF_VALUE: 33

## 2025-01-15 NOTE — CARE COORDINATION
Care Management Initial Assessment       RUR:13%  Readmission? No  1st IM letter given? Yes - Patient access gave on 1/15/25  1st  letter given: No --  N/A     01/15/25 1443   Service Assessment   Patient Orientation Other (see comment)  (Patient is intubated.)   Cognition Other (see comment)  (Patient is intubated.)   History Provided By Spouse;Child/Family   Primary Caregiver Self   Support Systems Spouse/Significant Other;Children   Patient's Healthcare Decision Maker is: Legal Next of Kin   PCP Verified by CM Yes   Last Visit to PCP   (Patient last saw pcp July 2024.)   Prior Functional Level Independent in ADLs/IADLs   Current Functional Level Assistance with the following:;Bathing;Dressing;Toileting;Feeding;Mobility   Can patient return to prior living arrangement Other (see comment)  (Family wants to see how the patient progresses.)   Family able to assist with home care needs: Other (comment)  (Family wants to see how the patient progresses.)   Would you like for me to discuss the discharge plan with any other family members/significant others, and if so, who?   (Patient has a .)   Financial Resources Medicare   Community Resources None   Social/Functional History   Lives With Spouse   Type of Home House  (One story)   Home Equipment None   Active  Yes   Discharge Planning   Type of Residence Other (Comment)  (Family wants to see how the patient progresses.)   Current Services Prior To Admission None   Patient expects to be discharged to: Other (comment)  (Family wants to see how the patient progresses.)         Spoke with the patient's spouse and he states she sees Dr. Ángela Zheng with [facility]. The number to the office is [phone]. He states the patient also has [facility] Medicare Supplemental plan. Made a copy of the card and forwarded to patient access to review.  She does not use home oxygen or cpap.         Advance Care Planning     General Advance Care

## 2025-01-15 NOTE — INTERDISCIPLINARY ROUNDS
Critical care interdisciplinary rounds today.  Following members present: Case Management, , Clinical Lead, Diabetes Team, Nursing, Nutrition, Pharmacy, and Physician. Family invited to participate.  Plan of care discussed.  See clinical pathway for plan of care and interventions and desired outcomes.     Pt. Remains intubated. Restraints in use.     Horton in place for strict I/O's. Keep for today per Dr. Smith.

## 2025-01-16 PROBLEM — G93.41 METABOLIC ENCEPHALOPATHY: Status: ACTIVE | Noted: 2025-01-16

## 2025-01-16 LAB
ALBUMIN SERPL-MCNC: 2.4 G/DL (ref 3.5–5)
ALBUMIN/GLOB SERPL: 0.7 (ref 1.1–2.2)
ALP SERPL-CCNC: 53 U/L (ref 45–117)
ALT SERPL-CCNC: 58 U/L (ref 12–78)
ANION GAP SERPL CALC-SCNC: 7 MMOL/L (ref 2–12)
AST SERPL-CCNC: 75 U/L (ref 15–37)
BILIRUB DIRECT SERPL-MCNC: 0.3 MG/DL (ref 0–0.2)
BILIRUB SERPL-MCNC: 1.2 MG/DL (ref 0.2–1)
BUN SERPL-MCNC: 20 MG/DL (ref 6–20)
BUN/CREAT SERPL: 23 (ref 12–20)
CALCIUM SERPL-MCNC: 7.6 MG/DL (ref 8.5–10.1)
CHLORIDE SERPL-SCNC: 104 MMOL/L (ref 97–108)
CO2 SERPL-SCNC: 26 MMOL/L (ref 21–32)
CREAT SERPL-MCNC: 0.87 MG/DL (ref 0.55–1.02)
ERYTHROCYTE [DISTWIDTH] IN BLOOD BY AUTOMATED COUNT: 13.7 % (ref 11.5–14.5)
GLOBULIN SER CALC-MCNC: 3.6 G/DL (ref 2–4)
GLUCOSE BLD STRIP.AUTO-MCNC: 130 MG/DL (ref 65–117)
GLUCOSE BLD STRIP.AUTO-MCNC: 143 MG/DL (ref 65–117)
GLUCOSE BLD STRIP.AUTO-MCNC: 151 MG/DL (ref 65–117)
GLUCOSE BLD STRIP.AUTO-MCNC: 161 MG/DL (ref 65–117)
GLUCOSE SERPL-MCNC: 157 MG/DL (ref 65–100)
HCT VFR BLD AUTO: 24.6 % (ref 35–47)
HGB BLD-MCNC: 8.3 G/DL (ref 11.5–16)
MAGNESIUM SERPL-MCNC: 1.8 MG/DL (ref 1.6–2.4)
MCH RBC QN AUTO: 32.3 PG (ref 26–34)
MCHC RBC AUTO-ENTMCNC: 33.7 G/DL (ref 30–36.5)
MCV RBC AUTO: 95.7 FL (ref 80–99)
NRBC # BLD: 0 K/UL (ref 0–0.01)
NRBC BLD-RTO: 0 PER 100 WBC
PHOSPHATE SERPL-MCNC: 2.1 MG/DL (ref 2.6–4.7)
PLATELET # BLD AUTO: 141 K/UL (ref 150–400)
PMV BLD AUTO: 10.8 FL (ref 8.9–12.9)
POTASSIUM SERPL-SCNC: 3.7 MMOL/L (ref 3.5–5.1)
PROT SERPL-MCNC: 6 G/DL (ref 6.4–8.2)
RBC # BLD AUTO: 2.57 M/UL (ref 3.8–5.2)
SERVICE CMNT-IMP: ABNORMAL
SODIUM SERPL-SCNC: 137 MMOL/L (ref 136–145)
WBC # BLD AUTO: 17.1 K/UL (ref 3.6–11)

## 2025-01-16 PROCEDURE — 6360000002 HC RX W HCPCS: Performed by: HOSPITALIST

## 2025-01-16 PROCEDURE — 85027 COMPLETE CBC AUTOMATED: CPT

## 2025-01-16 PROCEDURE — 36415 COLL VENOUS BLD VENIPUNCTURE: CPT

## 2025-01-16 PROCEDURE — 80076 HEPATIC FUNCTION PANEL: CPT

## 2025-01-16 PROCEDURE — 2000000000 HC ICU R&B

## 2025-01-16 PROCEDURE — 6360000002 HC RX W HCPCS: Performed by: NURSE PRACTITIONER

## 2025-01-16 PROCEDURE — 82962 GLUCOSE BLOOD TEST: CPT

## 2025-01-16 PROCEDURE — 2580000003 HC RX 258: Performed by: NURSE PRACTITIONER

## 2025-01-16 PROCEDURE — 6370000000 HC RX 637 (ALT 250 FOR IP): Performed by: INTERNAL MEDICINE

## 2025-01-16 PROCEDURE — 97535 SELF CARE MNGMENT TRAINING: CPT | Performed by: OCCUPATIONAL THERAPIST

## 2025-01-16 PROCEDURE — 51701 INSERT BLADDER CATHETER: CPT

## 2025-01-16 PROCEDURE — 99232 SBSQ HOSP IP/OBS MODERATE 35: CPT | Performed by: PSYCHIATRY & NEUROLOGY

## 2025-01-16 PROCEDURE — 80048 BASIC METABOLIC PNL TOTAL CA: CPT

## 2025-01-16 PROCEDURE — 97530 THERAPEUTIC ACTIVITIES: CPT | Performed by: OCCUPATIONAL THERAPIST

## 2025-01-16 PROCEDURE — 97165 OT EVAL LOW COMPLEX 30 MIN: CPT | Performed by: OCCUPATIONAL THERAPIST

## 2025-01-16 PROCEDURE — 2580000003 HC RX 258: Performed by: HOSPITALIST

## 2025-01-16 PROCEDURE — 2500000003 HC RX 250 WO HCPCS: Performed by: NURSE PRACTITIONER

## 2025-01-16 PROCEDURE — 83735 ASSAY OF MAGNESIUM: CPT

## 2025-01-16 PROCEDURE — 51798 US URINE CAPACITY MEASURE: CPT

## 2025-01-16 PROCEDURE — 2500000003 HC RX 250 WO HCPCS: Performed by: INTERNAL MEDICINE

## 2025-01-16 PROCEDURE — 6370000000 HC RX 637 (ALT 250 FOR IP): Performed by: HOSPITALIST

## 2025-01-16 PROCEDURE — 94003 VENT MGMT INPAT SUBQ DAY: CPT

## 2025-01-16 PROCEDURE — 2500000003 HC RX 250 WO HCPCS: Performed by: HOSPITALIST

## 2025-01-16 PROCEDURE — 6370000000 HC RX 637 (ALT 250 FOR IP): Performed by: NURSE PRACTITIONER

## 2025-01-16 PROCEDURE — 84100 ASSAY OF PHOSPHORUS: CPT

## 2025-01-16 RX ORDER — SODIUM CHLORIDE, SODIUM LACTATE, POTASSIUM CHLORIDE, AND CALCIUM CHLORIDE .6; .31; .03; .02 G/100ML; G/100ML; G/100ML; G/100ML
500 INJECTION, SOLUTION INTRAVENOUS ONCE
Status: COMPLETED | OUTPATIENT
Start: 2025-01-16 | End: 2025-01-16

## 2025-01-16 RX ORDER — ASPIRIN 81 MG/1
81 TABLET, CHEWABLE ORAL DAILY
Status: DISCONTINUED | OUTPATIENT
Start: 2025-01-17 | End: 2025-01-20 | Stop reason: HOSPADM

## 2025-01-16 RX ADMIN — SODIUM CHLORIDE, POTASSIUM CHLORIDE, SODIUM LACTATE AND CALCIUM CHLORIDE 500 ML: 600; 310; 30; 20 INJECTION, SOLUTION INTRAVENOUS at 01:14

## 2025-01-16 RX ADMIN — Medication: at 08:46

## 2025-01-16 RX ADMIN — SODIUM CHLORIDE, PRESERVATIVE FREE 10 ML: 5 INJECTION INTRAVENOUS at 21:05

## 2025-01-16 RX ADMIN — ACETAMINOPHEN 1000 MG: 500 TABLET, FILM COATED ORAL at 21:04

## 2025-01-16 RX ADMIN — ACETAMINOPHEN 1000 MG: 500 TABLET, FILM COATED ORAL at 06:02

## 2025-01-16 RX ADMIN — PIPERACILLIN AND TAZOBACTAM 3375 MG: 3; .375 INJECTION, POWDER, LYOPHILIZED, FOR SOLUTION INTRAVENOUS at 06:01

## 2025-01-16 RX ADMIN — DIBASIC SODIUM PHOSPHATE, MONOBASIC POTASSIUM PHOSPHATE AND MONOBASIC SODIUM PHOSPHATE 2 TABLET: 852; 155; 130 TABLET ORAL at 06:02

## 2025-01-16 RX ADMIN — DEXMEDETOMIDINE HYDROCHLORIDE 0.1 MCG/KG/HR: 4 INJECTION, SOLUTION INTRAVENOUS at 01:16

## 2025-01-16 RX ADMIN — ENOXAPARIN SODIUM 40 MG: 100 INJECTION SUBCUTANEOUS at 08:51

## 2025-01-16 RX ADMIN — SODIUM CHLORIDE, POTASSIUM CHLORIDE, SODIUM LACTATE AND CALCIUM CHLORIDE: 600; 310; 30; 20 INJECTION, SOLUTION INTRAVENOUS at 01:47

## 2025-01-16 RX ADMIN — PIPERACILLIN AND TAZOBACTAM 3375 MG: 3; .375 INJECTION, POWDER, LYOPHILIZED, FOR SOLUTION INTRAVENOUS at 22:18

## 2025-01-16 RX ADMIN — ACETAMINOPHEN 1000 MG: 500 TABLET, FILM COATED ORAL at 15:14

## 2025-01-16 RX ADMIN — TICAGRELOR 90 MG: 90 TABLET ORAL at 09:18

## 2025-01-16 RX ADMIN — Medication: at 21:13

## 2025-01-16 RX ADMIN — SODIUM CHLORIDE, PRESERVATIVE FREE 10 ML: 5 INJECTION INTRAVENOUS at 08:45

## 2025-01-16 RX ADMIN — PIPERACILLIN AND TAZOBACTAM 3375 MG: 3; .375 INJECTION, POWDER, LYOPHILIZED, FOR SOLUTION INTRAVENOUS at 15:16

## 2025-01-16 RX ADMIN — ASPIRIN 81 MG: 81 TABLET, CHEWABLE ORAL at 08:52

## 2025-01-16 RX ADMIN — Medication 1 AMPULE: at 08:45

## 2025-01-16 RX ADMIN — CHLORHEXIDINE GLUCONATE 15 ML: 1.2 RINSE ORAL at 08:45

## 2025-01-16 RX ADMIN — SODIUM CHLORIDE, PRESERVATIVE FREE 10 ML: 5 INJECTION INTRAVENOUS at 08:52

## 2025-01-16 RX ADMIN — SODIUM CHLORIDE, PRESERVATIVE FREE 40 MG: 5 INJECTION INTRAVENOUS at 08:52

## 2025-01-16 RX ADMIN — SODIUM CHLORIDE, POTASSIUM CHLORIDE, SODIUM LACTATE AND CALCIUM CHLORIDE: 600; 310; 30; 20 INJECTION, SOLUTION INTRAVENOUS at 07:09

## 2025-01-16 RX ADMIN — TICAGRELOR 90 MG: 90 TABLET ORAL at 21:04

## 2025-01-16 RX ADMIN — Medication 1 AMPULE: at 21:04

## 2025-01-16 RX ADMIN — DIBASIC SODIUM PHOSPHATE, MONOBASIC POTASSIUM PHOSPHATE AND MONOBASIC SODIUM PHOSPHATE 2 TABLET: 852; 155; 130 TABLET ORAL at 21:04

## 2025-01-16 ASSESSMENT — PULMONARY FUNCTION TESTS
PIF_VALUE: 11
PIF_VALUE: 25
PIF_VALUE: 27
PIF_VALUE: 15

## 2025-01-17 ENCOUNTER — APPOINTMENT (OUTPATIENT)
Facility: HOSPITAL | Age: 75
DRG: 250 | End: 2025-01-17
Payer: MEDICARE

## 2025-01-17 LAB
ANION GAP SERPL CALC-SCNC: 8 MMOL/L (ref 2–12)
BASOPHILS # BLD: 0.04 K/UL (ref 0–0.1)
BASOPHILS NFR BLD: 0.3 % (ref 0–1)
BUN SERPL-MCNC: 15 MG/DL (ref 6–20)
BUN/CREAT SERPL: 18 (ref 12–20)
CALCIUM SERPL-MCNC: 8.5 MG/DL (ref 8.5–10.1)
CHLORIDE SERPL-SCNC: 107 MMOL/L (ref 97–108)
CO2 SERPL-SCNC: 26 MMOL/L (ref 21–32)
CREAT SERPL-MCNC: 0.83 MG/DL (ref 0.55–1.02)
DIFFERENTIAL METHOD BLD: ABNORMAL
EOSINOPHIL # BLD: 0.04 K/UL (ref 0–0.4)
EOSINOPHIL NFR BLD: 0.3 % (ref 0–7)
ERYTHROCYTE [DISTWIDTH] IN BLOOD BY AUTOMATED COUNT: 13.9 % (ref 11.5–14.5)
FERRITIN SERPL-MCNC: 1059 NG/ML (ref 8–252)
FOLATE SERPL-MCNC: 29.6 NG/ML (ref 5–21)
GLUCOSE BLD STRIP.AUTO-MCNC: 131 MG/DL (ref 65–117)
GLUCOSE BLD STRIP.AUTO-MCNC: 136 MG/DL (ref 65–117)
GLUCOSE BLD STRIP.AUTO-MCNC: 152 MG/DL (ref 65–117)
GLUCOSE SERPL-MCNC: 132 MG/DL (ref 65–100)
HAPTOGLOB SERPL-MCNC: 253 MG/DL (ref 30–200)
HCT VFR BLD AUTO: 26 % (ref 35–47)
HGB BLD-MCNC: 8.6 G/DL (ref 11.5–16)
IMM GRANULOCYTES # BLD AUTO: 0.11 K/UL (ref 0–0.04)
IMM GRANULOCYTES NFR BLD AUTO: 0.8 % (ref 0–0.5)
IRON SATN MFR SERPL: 16 % (ref 20–50)
IRON SERPL-MCNC: 37 UG/DL (ref 35–150)
LYMPHOCYTES # BLD: 2.37 K/UL (ref 0.8–3.5)
LYMPHOCYTES NFR BLD: 16.2 % (ref 12–49)
MAGNESIUM SERPL-MCNC: 2 MG/DL (ref 1.6–2.4)
MCH RBC QN AUTO: 32.5 PG (ref 26–34)
MCHC RBC AUTO-ENTMCNC: 33.1 G/DL (ref 30–36.5)
MCV RBC AUTO: 98.1 FL (ref 80–99)
MONOCYTES # BLD: 0.84 K/UL (ref 0–1)
MONOCYTES NFR BLD: 5.8 % (ref 5–13)
NEUTS SEG # BLD: 11.19 K/UL (ref 1.8–8)
NEUTS SEG NFR BLD: 76.6 % (ref 32–75)
NRBC # BLD: 0 K/UL (ref 0–0.01)
NRBC BLD-RTO: 0 PER 100 WBC
NT PRO BNP: 4258 PG/ML
PHOSPHATE SERPL-MCNC: 2.8 MG/DL (ref 2.6–4.7)
PLATELET # BLD AUTO: 158 K/UL (ref 150–400)
PMV BLD AUTO: 10.9 FL (ref 8.9–12.9)
POTASSIUM SERPL-SCNC: 3.4 MMOL/L (ref 3.5–5.1)
RBC # BLD AUTO: 2.65 M/UL (ref 3.8–5.2)
RETICS # AUTO: 0.09 M/UL (ref 0.02–0.08)
RETICS/RBC NFR AUTO: 3.3 % (ref 0.7–2.1)
SERVICE CMNT-IMP: ABNORMAL
SODIUM SERPL-SCNC: 141 MMOL/L (ref 136–145)
TIBC SERPL-MCNC: 225 UG/DL (ref 250–450)
VIT B12 SERPL-MCNC: 321 PG/ML (ref 193–986)
WBC # BLD AUTO: 14.6 K/UL (ref 3.6–11)

## 2025-01-17 PROCEDURE — 85025 COMPLETE CBC W/AUTO DIFF WBC: CPT

## 2025-01-17 PROCEDURE — 51702 INSERT TEMP BLADDER CATH: CPT

## 2025-01-17 PROCEDURE — 82607 VITAMIN B-12: CPT

## 2025-01-17 PROCEDURE — 97162 PT EVAL MOD COMPLEX 30 MIN: CPT

## 2025-01-17 PROCEDURE — 2060000000 HC ICU INTERMEDIATE R&B

## 2025-01-17 PROCEDURE — 2500000003 HC RX 250 WO HCPCS: Performed by: NURSE PRACTITIONER

## 2025-01-17 PROCEDURE — 97116 GAIT TRAINING THERAPY: CPT

## 2025-01-17 PROCEDURE — 6360000002 HC RX W HCPCS: Performed by: HOSPITALIST

## 2025-01-17 PROCEDURE — 83550 IRON BINDING TEST: CPT

## 2025-01-17 PROCEDURE — 6370000000 HC RX 637 (ALT 250 FOR IP): Performed by: NURSE PRACTITIONER

## 2025-01-17 PROCEDURE — 97535 SELF CARE MNGMENT TRAINING: CPT | Performed by: OCCUPATIONAL THERAPIST

## 2025-01-17 PROCEDURE — 71045 X-RAY EXAM CHEST 1 VIEW: CPT

## 2025-01-17 PROCEDURE — 83010 ASSAY OF HAPTOGLOBIN QUANT: CPT

## 2025-01-17 PROCEDURE — 2580000003 HC RX 258: Performed by: HOSPITALIST

## 2025-01-17 PROCEDURE — 82728 ASSAY OF FERRITIN: CPT

## 2025-01-17 PROCEDURE — 83540 ASSAY OF IRON: CPT

## 2025-01-17 PROCEDURE — 6370000000 HC RX 637 (ALT 250 FOR IP): Performed by: INTERNAL MEDICINE

## 2025-01-17 PROCEDURE — 82962 GLUCOSE BLOOD TEST: CPT

## 2025-01-17 PROCEDURE — 51798 US URINE CAPACITY MEASURE: CPT

## 2025-01-17 PROCEDURE — 83735 ASSAY OF MAGNESIUM: CPT

## 2025-01-17 PROCEDURE — 36415 COLL VENOUS BLD VENIPUNCTURE: CPT

## 2025-01-17 PROCEDURE — 97530 THERAPEUTIC ACTIVITIES: CPT

## 2025-01-17 PROCEDURE — 80048 BASIC METABOLIC PNL TOTAL CA: CPT

## 2025-01-17 PROCEDURE — 6370000000 HC RX 637 (ALT 250 FOR IP): Performed by: HOSPITALIST

## 2025-01-17 PROCEDURE — 82746 ASSAY OF FOLIC ACID SERUM: CPT

## 2025-01-17 PROCEDURE — 2580000003 HC RX 258: Performed by: NURSE PRACTITIONER

## 2025-01-17 PROCEDURE — 83880 ASSAY OF NATRIURETIC PEPTIDE: CPT

## 2025-01-17 PROCEDURE — 84100 ASSAY OF PHOSPHORUS: CPT

## 2025-01-17 PROCEDURE — 85045 AUTOMATED RETICULOCYTE COUNT: CPT

## 2025-01-17 PROCEDURE — 74018 RADEX ABDOMEN 1 VIEW: CPT

## 2025-01-17 PROCEDURE — 97530 THERAPEUTIC ACTIVITIES: CPT | Performed by: OCCUPATIONAL THERAPIST

## 2025-01-17 PROCEDURE — 51701 INSERT BLADDER CATHETER: CPT

## 2025-01-17 RX ORDER — HYDROCHLOROTHIAZIDE 25 MG/1
12.5 TABLET ORAL DAILY
Status: DISCONTINUED | OUTPATIENT
Start: 2025-01-17 | End: 2025-01-20 | Stop reason: HOSPADM

## 2025-01-17 RX ORDER — VALSARTAN AND HYDROCHLOROTHIAZIDE 320; 12.5 MG/1; MG/1
1 TABLET, FILM COATED ORAL DAILY
Status: ON HOLD | COMMUNITY
End: 2025-01-20 | Stop reason: HOSPADM

## 2025-01-17 RX ORDER — ROSUVASTATIN CALCIUM 10 MG/1
10 TABLET, COATED ORAL
COMMUNITY

## 2025-01-17 RX ORDER — HYDRALAZINE HYDROCHLORIDE 20 MG/ML
10 INJECTION INTRAMUSCULAR; INTRAVENOUS EVERY 6 HOURS PRN
Status: DISCONTINUED | OUTPATIENT
Start: 2025-01-17 | End: 2025-01-20 | Stop reason: HOSPADM

## 2025-01-17 RX ORDER — AMLODIPINE BESYLATE 5 MG/1
5 TABLET ORAL DAILY
Status: DISCONTINUED | OUTPATIENT
Start: 2025-01-17 | End: 2025-01-20 | Stop reason: HOSPADM

## 2025-01-17 RX ORDER — ROSUVASTATIN CALCIUM 10 MG/1
10 TABLET, COATED ORAL NIGHTLY
Status: DISCONTINUED | OUTPATIENT
Start: 2025-01-17 | End: 2025-01-20 | Stop reason: HOSPADM

## 2025-01-17 RX ORDER — FUROSEMIDE 10 MG/ML
40 INJECTION INTRAMUSCULAR; INTRAVENOUS 2 TIMES DAILY
Status: DISCONTINUED | OUTPATIENT
Start: 2025-01-17 | End: 2025-01-20 | Stop reason: HOSPADM

## 2025-01-17 RX ORDER — VALSARTAN 160 MG/1
160 TABLET ORAL DAILY
Status: DISCONTINUED | OUTPATIENT
Start: 2025-01-17 | End: 2025-01-20 | Stop reason: HOSPADM

## 2025-01-17 RX ADMIN — PIPERACILLIN AND TAZOBACTAM 3375 MG: 3; .375 INJECTION, POWDER, LYOPHILIZED, FOR SOLUTION INTRAVENOUS at 22:39

## 2025-01-17 RX ADMIN — Medication: at 21:32

## 2025-01-17 RX ADMIN — Medication: at 10:00

## 2025-01-17 RX ADMIN — ASPIRIN 81 MG: 81 TABLET, CHEWABLE ORAL at 09:59

## 2025-01-17 RX ADMIN — FUROSEMIDE 40 MG: 10 INJECTION, SOLUTION INTRAMUSCULAR; INTRAVENOUS at 18:45

## 2025-01-17 RX ADMIN — SODIUM CHLORIDE, PRESERVATIVE FREE 10 ML: 5 INJECTION INTRAVENOUS at 21:33

## 2025-01-17 RX ADMIN — Medication 1 AMPULE: at 10:00

## 2025-01-17 RX ADMIN — ENOXAPARIN SODIUM 40 MG: 100 INJECTION SUBCUTANEOUS at 10:00

## 2025-01-17 RX ADMIN — SODIUM CHLORIDE 25 ML: 900 INJECTION INTRAVENOUS at 22:38

## 2025-01-17 RX ADMIN — PIPERACILLIN AND TAZOBACTAM 3375 MG: 3; .375 INJECTION, POWDER, LYOPHILIZED, FOR SOLUTION INTRAVENOUS at 07:11

## 2025-01-17 RX ADMIN — PIPERACILLIN AND TAZOBACTAM 3375 MG: 3; .375 INJECTION, POWDER, LYOPHILIZED, FOR SOLUTION INTRAVENOUS at 15:00

## 2025-01-17 RX ADMIN — TICAGRELOR 90 MG: 90 TABLET ORAL at 09:59

## 2025-01-17 RX ADMIN — POTASSIUM BICARBONATE 40 MEQ: 782 TABLET, EFFERVESCENT ORAL at 09:59

## 2025-01-17 RX ADMIN — FUROSEMIDE 40 MG: 10 INJECTION, SOLUTION INTRAMUSCULAR; INTRAVENOUS at 11:45

## 2025-01-17 RX ADMIN — SODIUM CHLORIDE, PRESERVATIVE FREE 10 ML: 5 INJECTION INTRAVENOUS at 10:00

## 2025-01-17 NOTE — INTERDISCIPLINARY ROUNDS
Critical care interdisciplinary rounds today.  Following members present: Case Management, Nursing, Nutrition, Pharmacy, and Physician. Family invited to participate. (Son) To move out of unit later today.   Patient retaining urine, to give diuretics ,also. MD prefers we reinsert a Horton. To move to Stepdown later today.    Plan of care discussed.  See clinical pathway for plan of care and interventions and desired outcomes.

## 2025-01-18 ENCOUNTER — APPOINTMENT (OUTPATIENT)
Facility: HOSPITAL | Age: 75
DRG: 250 | End: 2025-01-18
Payer: MEDICARE

## 2025-01-18 LAB
ANION GAP SERPL CALC-SCNC: 7 MMOL/L (ref 2–12)
BASOPHILS # BLD: 0.06 K/UL (ref 0–0.1)
BASOPHILS NFR BLD: 0.4 % (ref 0–1)
BUN SERPL-MCNC: 18 MG/DL (ref 6–20)
BUN/CREAT SERPL: 19 (ref 12–20)
CALCIUM SERPL-MCNC: 8.7 MG/DL (ref 8.5–10.1)
CHLORIDE SERPL-SCNC: 102 MMOL/L (ref 97–108)
CO2 SERPL-SCNC: 30 MMOL/L (ref 21–32)
CREAT SERPL-MCNC: 0.94 MG/DL (ref 0.55–1.02)
DIFFERENTIAL METHOD BLD: ABNORMAL
EOSINOPHIL # BLD: 0.12 K/UL (ref 0–0.4)
EOSINOPHIL NFR BLD: 0.8 % (ref 0–7)
ERYTHROCYTE [DISTWIDTH] IN BLOOD BY AUTOMATED COUNT: 14 % (ref 11.5–14.5)
GLUCOSE BLD STRIP.AUTO-MCNC: 133 MG/DL (ref 65–117)
GLUCOSE BLD STRIP.AUTO-MCNC: 135 MG/DL (ref 65–117)
GLUCOSE BLD STRIP.AUTO-MCNC: 140 MG/DL (ref 65–117)
GLUCOSE BLD STRIP.AUTO-MCNC: 151 MG/DL (ref 65–117)
GLUCOSE BLD STRIP.AUTO-MCNC: 152 MG/DL (ref 65–117)
GLUCOSE SERPL-MCNC: 133 MG/DL (ref 65–100)
HCT VFR BLD AUTO: 27.1 % (ref 35–47)
HGB BLD-MCNC: 9 G/DL (ref 11.5–16)
IMM GRANULOCYTES # BLD AUTO: 0.1 K/UL (ref 0–0.04)
IMM GRANULOCYTES NFR BLD AUTO: 0.7 % (ref 0–0.5)
LYMPHOCYTES # BLD: 1.99 K/UL (ref 0.8–3.5)
LYMPHOCYTES NFR BLD: 13.6 % (ref 12–49)
MAGNESIUM SERPL-MCNC: 1.9 MG/DL (ref 1.6–2.4)
MCH RBC QN AUTO: 32.8 PG (ref 26–34)
MCHC RBC AUTO-ENTMCNC: 33.2 G/DL (ref 30–36.5)
MCV RBC AUTO: 98.9 FL (ref 80–99)
MONOCYTES # BLD: 1.2 K/UL (ref 0–1)
MONOCYTES NFR BLD: 8.2 % (ref 5–13)
NEUTS SEG # BLD: 11.13 K/UL (ref 1.8–8)
NEUTS SEG NFR BLD: 76.3 % (ref 32–75)
NRBC # BLD: 0.04 K/UL (ref 0–0.01)
NRBC BLD-RTO: 0.3 PER 100 WBC
PHOSPHATE SERPL-MCNC: 3.6 MG/DL (ref 2.6–4.7)
PLATELET # BLD AUTO: 203 K/UL (ref 150–400)
PMV BLD AUTO: 10.5 FL (ref 8.9–12.9)
POTASSIUM SERPL-SCNC: 3.1 MMOL/L (ref 3.5–5.1)
PROCALCITONIN SERPL-MCNC: 0.63 NG/ML
RBC # BLD AUTO: 2.74 M/UL (ref 3.8–5.2)
SERVICE CMNT-IMP: ABNORMAL
SODIUM SERPL-SCNC: 139 MMOL/L (ref 136–145)
WBC # BLD AUTO: 14.6 K/UL (ref 3.6–11)

## 2025-01-18 PROCEDURE — 2580000003 HC RX 258: Performed by: NURSE PRACTITIONER

## 2025-01-18 PROCEDURE — 2700000000 HC OXYGEN THERAPY PER DAY

## 2025-01-18 PROCEDURE — 82962 GLUCOSE BLOOD TEST: CPT

## 2025-01-18 PROCEDURE — 83735 ASSAY OF MAGNESIUM: CPT

## 2025-01-18 PROCEDURE — 84145 PROCALCITONIN (PCT): CPT

## 2025-01-18 PROCEDURE — 6360000002 HC RX W HCPCS: Performed by: INTERNAL MEDICINE

## 2025-01-18 PROCEDURE — 2060000000 HC ICU INTERMEDIATE R&B

## 2025-01-18 PROCEDURE — 74018 RADEX ABDOMEN 1 VIEW: CPT

## 2025-01-18 PROCEDURE — 2580000003 HC RX 258: Performed by: HOSPITALIST

## 2025-01-18 PROCEDURE — 6370000000 HC RX 637 (ALT 250 FOR IP): Performed by: NURSE PRACTITIONER

## 2025-01-18 PROCEDURE — 6370000000 HC RX 637 (ALT 250 FOR IP): Performed by: HOSPITALIST

## 2025-01-18 PROCEDURE — 51702 INSERT TEMP BLADDER CATH: CPT

## 2025-01-18 PROCEDURE — 2580000003 HC RX 258: Performed by: INTERNAL MEDICINE

## 2025-01-18 PROCEDURE — 84100 ASSAY OF PHOSPHORUS: CPT

## 2025-01-18 PROCEDURE — 6370000000 HC RX 637 (ALT 250 FOR IP): Performed by: INTERNAL MEDICINE

## 2025-01-18 PROCEDURE — 80048 BASIC METABOLIC PNL TOTAL CA: CPT

## 2025-01-18 PROCEDURE — 85025 COMPLETE CBC W/AUTO DIFF WBC: CPT

## 2025-01-18 PROCEDURE — 36415 COLL VENOUS BLD VENIPUNCTURE: CPT

## 2025-01-18 PROCEDURE — 2500000003 HC RX 250 WO HCPCS: Performed by: NURSE PRACTITIONER

## 2025-01-18 PROCEDURE — 6360000002 HC RX W HCPCS: Performed by: HOSPITALIST

## 2025-01-18 RX ORDER — SODIUM CHLORIDE 9 MG/ML
INJECTION, SOLUTION INTRAVENOUS CONTINUOUS
Status: DISCONTINUED | OUTPATIENT
Start: 2025-01-18 | End: 2025-01-19

## 2025-01-18 RX ORDER — POTASSIUM CHLORIDE 7.45 MG/ML
10 INJECTION INTRAVENOUS
Status: COMPLETED | OUTPATIENT
Start: 2025-01-18 | End: 2025-01-18

## 2025-01-18 RX ADMIN — PIPERACILLIN AND TAZOBACTAM 3375 MG: 3; .375 INJECTION, POWDER, LYOPHILIZED, FOR SOLUTION INTRAVENOUS at 22:27

## 2025-01-18 RX ADMIN — POTASSIUM CHLORIDE 10 MEQ: 10 INJECTION, SOLUTION INTRAVENOUS at 14:16

## 2025-01-18 RX ADMIN — ENOXAPARIN SODIUM 40 MG: 100 INJECTION SUBCUTANEOUS at 09:25

## 2025-01-18 RX ADMIN — Medication: at 21:48

## 2025-01-18 RX ADMIN — FUROSEMIDE 40 MG: 10 INJECTION, SOLUTION INTRAMUSCULAR; INTRAVENOUS at 18:06

## 2025-01-18 RX ADMIN — Medication: at 09:20

## 2025-01-18 RX ADMIN — Medication 6 MG: at 21:48

## 2025-01-18 RX ADMIN — HYDROCHLOROTHIAZIDE 12.5 MG: 25 TABLET ORAL at 09:22

## 2025-01-18 RX ADMIN — VALSARTAN 160 MG: 160 TABLET, FILM COATED ORAL at 09:16

## 2025-01-18 RX ADMIN — ASPIRIN 81 MG: 81 TABLET, CHEWABLE ORAL at 09:17

## 2025-01-18 RX ADMIN — AMLODIPINE BESYLATE 5 MG: 5 TABLET ORAL at 09:19

## 2025-01-18 RX ADMIN — PIPERACILLIN AND TAZOBACTAM 3375 MG: 3; .375 INJECTION, POWDER, LYOPHILIZED, FOR SOLUTION INTRAVENOUS at 14:49

## 2025-01-18 RX ADMIN — SODIUM CHLORIDE, PRESERVATIVE FREE 10 ML: 5 INJECTION INTRAVENOUS at 21:49

## 2025-01-18 RX ADMIN — POTASSIUM CHLORIDE 10 MEQ: 10 INJECTION, SOLUTION INTRAVENOUS at 12:52

## 2025-01-18 RX ADMIN — SODIUM CHLORIDE: 900 INJECTION INTRAVENOUS at 14:47

## 2025-01-18 RX ADMIN — POTASSIUM CHLORIDE 10 MEQ: 10 INJECTION, SOLUTION INTRAVENOUS at 10:35

## 2025-01-18 RX ADMIN — ROSUVASTATIN CALCIUM 10 MG: 10 TABLET, FILM COATED ORAL at 21:48

## 2025-01-18 RX ADMIN — FUROSEMIDE 40 MG: 10 INJECTION, SOLUTION INTRAMUSCULAR; INTRAVENOUS at 09:32

## 2025-01-18 RX ADMIN — ACETAMINOPHEN 1000 MG: 500 TABLET, FILM COATED ORAL at 21:47

## 2025-01-18 RX ADMIN — SODIUM CHLORIDE, PRESERVATIVE FREE 10 ML: 5 INJECTION INTRAVENOUS at 09:33

## 2025-01-18 RX ADMIN — PIPERACILLIN AND TAZOBACTAM 3375 MG: 3; .375 INJECTION, POWDER, LYOPHILIZED, FOR SOLUTION INTRAVENOUS at 06:12

## 2025-01-18 RX ADMIN — Medication 1 AMPULE: at 09:32

## 2025-01-18 RX ADMIN — SODIUM CHLORIDE: 9 INJECTION, SOLUTION INTRAVENOUS at 10:48

## 2025-01-18 RX ADMIN — POTASSIUM CHLORIDE 10 MEQ: 10 INJECTION, SOLUTION INTRAVENOUS at 11:38

## 2025-01-18 RX ADMIN — Medication 1 AMPULE: at 21:47

## 2025-01-18 RX ADMIN — TICAGRELOR 90 MG: 90 TABLET ORAL at 09:21

## 2025-01-18 RX ADMIN — TICAGRELOR 90 MG: 90 TABLET ORAL at 21:48

## 2025-01-18 RX ADMIN — ACETAMINOPHEN 1000 MG: 500 TABLET, FILM COATED ORAL at 14:35

## 2025-01-18 RX ADMIN — SODIUM CHLORIDE 25 ML: 900 INJECTION INTRAVENOUS at 06:11

## 2025-01-18 ASSESSMENT — PAIN - FUNCTIONAL ASSESSMENT: PAIN_FUNCTIONAL_ASSESSMENT: PREVENTS OR INTERFERES SOME ACTIVE ACTIVITIES AND ADLS

## 2025-01-18 ASSESSMENT — PAIN DESCRIPTION - LOCATION: LOCATION: OTHER (COMMENT)

## 2025-01-18 ASSESSMENT — PAIN DESCRIPTION - ORIENTATION: ORIENTATION: OTHER (COMMENT)

## 2025-01-19 ENCOUNTER — APPOINTMENT (OUTPATIENT)
Facility: HOSPITAL | Age: 75
DRG: 250 | End: 2025-01-19
Payer: MEDICARE

## 2025-01-19 LAB
ANION GAP SERPL CALC-SCNC: 8 MMOL/L (ref 2–12)
BASOPHILS # BLD: 0.05 K/UL (ref 0–0.1)
BASOPHILS NFR BLD: 0.4 % (ref 0–1)
BUN SERPL-MCNC: 23 MG/DL (ref 6–20)
BUN/CREAT SERPL: 23 (ref 12–20)
CALCIUM SERPL-MCNC: 8.8 MG/DL (ref 8.5–10.1)
CHLORIDE SERPL-SCNC: 100 MMOL/L (ref 97–108)
CO2 SERPL-SCNC: 31 MMOL/L (ref 21–32)
CREAT SERPL-MCNC: 1.02 MG/DL (ref 0.55–1.02)
DIFFERENTIAL METHOD BLD: ABNORMAL
EOSINOPHIL # BLD: 0.33 K/UL (ref 0–0.4)
EOSINOPHIL NFR BLD: 2.3 % (ref 0–7)
ERYTHROCYTE [DISTWIDTH] IN BLOOD BY AUTOMATED COUNT: 14 % (ref 11.5–14.5)
GLUCOSE BLD STRIP.AUTO-MCNC: 135 MG/DL (ref 65–117)
GLUCOSE BLD STRIP.AUTO-MCNC: 136 MG/DL (ref 65–117)
GLUCOSE BLD STRIP.AUTO-MCNC: 139 MG/DL (ref 65–117)
GLUCOSE BLD STRIP.AUTO-MCNC: 168 MG/DL (ref 65–117)
GLUCOSE BLD STRIP.AUTO-MCNC: 187 MG/DL (ref 65–117)
GLUCOSE SERPL-MCNC: 134 MG/DL (ref 65–100)
HCT VFR BLD AUTO: 29.8 % (ref 35–47)
HGB BLD-MCNC: 9.8 G/DL (ref 11.5–16)
IMM GRANULOCYTES # BLD AUTO: 0.14 K/UL (ref 0–0.04)
IMM GRANULOCYTES NFR BLD AUTO: 1 % (ref 0–0.5)
LYMPHOCYTES # BLD: 1.7 K/UL (ref 0.8–3.5)
LYMPHOCYTES NFR BLD: 12 % (ref 12–49)
MAGNESIUM SERPL-MCNC: 2.1 MG/DL (ref 1.6–2.4)
MCH RBC QN AUTO: 32.5 PG (ref 26–34)
MCHC RBC AUTO-ENTMCNC: 32.9 G/DL (ref 30–36.5)
MCV RBC AUTO: 98.7 FL (ref 80–99)
MONOCYTES # BLD: 1.24 K/UL (ref 0–1)
MONOCYTES NFR BLD: 8.7 % (ref 5–13)
NEUTS SEG # BLD: 10.75 K/UL (ref 1.8–8)
NEUTS SEG NFR BLD: 75.6 % (ref 32–75)
NRBC # BLD: 0.04 K/UL (ref 0–0.01)
NRBC BLD-RTO: 0.3 PER 100 WBC
PHOSPHATE SERPL-MCNC: 5 MG/DL (ref 2.6–4.7)
PLATELET # BLD AUTO: 243 K/UL (ref 150–400)
PMV BLD AUTO: 10.1 FL (ref 8.9–12.9)
POTASSIUM SERPL-SCNC: 3 MMOL/L (ref 3.5–5.1)
RBC # BLD AUTO: 3.02 M/UL (ref 3.8–5.2)
SERVICE CMNT-IMP: ABNORMAL
SODIUM SERPL-SCNC: 139 MMOL/L (ref 136–145)
WBC # BLD AUTO: 14.2 K/UL (ref 3.6–11)

## 2025-01-19 PROCEDURE — 2580000003 HC RX 258: Performed by: HOSPITALIST

## 2025-01-19 PROCEDURE — 6370000000 HC RX 637 (ALT 250 FOR IP): Performed by: INTERNAL MEDICINE

## 2025-01-19 PROCEDURE — 51702 INSERT TEMP BLADDER CATH: CPT

## 2025-01-19 PROCEDURE — 83735 ASSAY OF MAGNESIUM: CPT

## 2025-01-19 PROCEDURE — 84100 ASSAY OF PHOSPHORUS: CPT

## 2025-01-19 PROCEDURE — 6360000002 HC RX W HCPCS: Performed by: HOSPITALIST

## 2025-01-19 PROCEDURE — 6360000002 HC RX W HCPCS: Performed by: NURSE PRACTITIONER

## 2025-01-19 PROCEDURE — 2700000000 HC OXYGEN THERAPY PER DAY

## 2025-01-19 PROCEDURE — 2500000003 HC RX 250 WO HCPCS: Performed by: NURSE PRACTITIONER

## 2025-01-19 PROCEDURE — 74018 RADEX ABDOMEN 1 VIEW: CPT

## 2025-01-19 PROCEDURE — 6370000000 HC RX 637 (ALT 250 FOR IP): Performed by: HOSPITALIST

## 2025-01-19 PROCEDURE — 6360000002 HC RX W HCPCS: Performed by: INTERNAL MEDICINE

## 2025-01-19 PROCEDURE — 2060000000 HC ICU INTERMEDIATE R&B

## 2025-01-19 PROCEDURE — 80048 BASIC METABOLIC PNL TOTAL CA: CPT

## 2025-01-19 PROCEDURE — 85025 COMPLETE CBC W/AUTO DIFF WBC: CPT

## 2025-01-19 PROCEDURE — 82962 GLUCOSE BLOOD TEST: CPT

## 2025-01-19 PROCEDURE — 2580000003 HC RX 258: Performed by: NURSE PRACTITIONER

## 2025-01-19 PROCEDURE — 36415 COLL VENOUS BLD VENIPUNCTURE: CPT

## 2025-01-19 RX ORDER — POTASSIUM CHLORIDE 7.45 MG/ML
10 INJECTION INTRAVENOUS
Status: DISPENSED | OUTPATIENT
Start: 2025-01-19 | End: 2025-01-19

## 2025-01-19 RX ORDER — POTASSIUM CHLORIDE 7.45 MG/ML
10 INJECTION INTRAVENOUS ONCE
Status: COMPLETED | OUTPATIENT
Start: 2025-01-19 | End: 2025-01-19

## 2025-01-19 RX ADMIN — POTASSIUM CHLORIDE 10 MEQ: 7.45 INJECTION INTRAVENOUS at 08:52

## 2025-01-19 RX ADMIN — Medication 6 MG: at 22:00

## 2025-01-19 RX ADMIN — PIPERACILLIN AND TAZOBACTAM 3375 MG: 3; .375 INJECTION, POWDER, LYOPHILIZED, FOR SOLUTION INTRAVENOUS at 06:16

## 2025-01-19 RX ADMIN — POTASSIUM CHLORIDE 10 MEQ: 7.45 INJECTION INTRAVENOUS at 06:21

## 2025-01-19 RX ADMIN — SODIUM CHLORIDE, PRESERVATIVE FREE 10 ML: 5 INJECTION INTRAVENOUS at 22:01

## 2025-01-19 RX ADMIN — HYDROCHLOROTHIAZIDE 12.5 MG: 25 TABLET ORAL at 10:25

## 2025-01-19 RX ADMIN — AMLODIPINE BESYLATE 5 MG: 5 TABLET ORAL at 10:36

## 2025-01-19 RX ADMIN — SODIUM CHLORIDE, PRESERVATIVE FREE 10 ML: 5 INJECTION INTRAVENOUS at 10:37

## 2025-01-19 RX ADMIN — TICAGRELOR 90 MG: 90 TABLET ORAL at 22:00

## 2025-01-19 RX ADMIN — PIPERACILLIN AND TAZOBACTAM 3375 MG: 3; .375 INJECTION, POWDER, LYOPHILIZED, FOR SOLUTION INTRAVENOUS at 15:11

## 2025-01-19 RX ADMIN — Medication: at 22:01

## 2025-01-19 RX ADMIN — SODIUM CHLORIDE 25 ML: 900 INJECTION INTRAVENOUS at 08:51

## 2025-01-19 RX ADMIN — FUROSEMIDE 40 MG: 10 INJECTION, SOLUTION INTRAMUSCULAR; INTRAVENOUS at 10:24

## 2025-01-19 RX ADMIN — VALSARTAN 160 MG: 160 TABLET, FILM COATED ORAL at 10:24

## 2025-01-19 RX ADMIN — PIPERACILLIN AND TAZOBACTAM 3375 MG: 3; .375 INJECTION, POWDER, LYOPHILIZED, FOR SOLUTION INTRAVENOUS at 22:04

## 2025-01-19 RX ADMIN — Medication: at 10:38

## 2025-01-19 RX ADMIN — POTASSIUM BICARBONATE 40 MEQ: 782 TABLET, EFFERVESCENT ORAL at 12:45

## 2025-01-19 RX ADMIN — SODIUM CHLORIDE 25 ML: 900 INJECTION INTRAVENOUS at 15:07

## 2025-01-19 RX ADMIN — ENOXAPARIN SODIUM 40 MG: 100 INJECTION SUBCUTANEOUS at 10:23

## 2025-01-19 RX ADMIN — Medication 1 AMPULE: at 22:01

## 2025-01-19 RX ADMIN — ACETAMINOPHEN 1000 MG: 500 TABLET, FILM COATED ORAL at 14:50

## 2025-01-19 RX ADMIN — ACETAMINOPHEN 1000 MG: 500 TABLET, FILM COATED ORAL at 22:00

## 2025-01-19 RX ADMIN — ROSUVASTATIN CALCIUM 10 MG: 10 TABLET, FILM COATED ORAL at 22:00

## 2025-01-19 RX ADMIN — TICAGRELOR 90 MG: 90 TABLET ORAL at 10:24

## 2025-01-19 RX ADMIN — SODIUM CHLORIDE 25 ML: 900 INJECTION INTRAVENOUS at 10:21

## 2025-01-19 RX ADMIN — POTASSIUM CHLORIDE 10 MEQ: 7.46 INJECTION, SOLUTION INTRAVENOUS at 10:22

## 2025-01-19 RX ADMIN — ACETAMINOPHEN 1000 MG: 500 TABLET, FILM COATED ORAL at 06:21

## 2025-01-19 RX ADMIN — POTASSIUM CHLORIDE 10 MEQ: 7.45 INJECTION INTRAVENOUS at 07:21

## 2025-01-19 RX ADMIN — ASPIRIN 81 MG: 81 TABLET, CHEWABLE ORAL at 10:24

## 2025-01-19 ASSESSMENT — PAIN DESCRIPTION - DESCRIPTORS
DESCRIPTORS: ACHING
DESCRIPTORS: ACHING

## 2025-01-19 ASSESSMENT — PAIN - FUNCTIONAL ASSESSMENT
PAIN_FUNCTIONAL_ASSESSMENT: ACTIVITIES ARE NOT PREVENTED
PAIN_FUNCTIONAL_ASSESSMENT: PREVENTS OR INTERFERES SOME ACTIVE ACTIVITIES AND ADLS
PAIN_FUNCTIONAL_ASSESSMENT: ACTIVITIES ARE NOT PREVENTED

## 2025-01-19 ASSESSMENT — PAIN DESCRIPTION - ORIENTATION
ORIENTATION: OTHER (COMMENT)
ORIENTATION: LOWER
ORIENTATION: LOWER

## 2025-01-19 ASSESSMENT — PAIN DESCRIPTION - LOCATION
LOCATION: OTHER (COMMENT)
LOCATION: BACK
LOCATION: BACK

## 2025-01-20 VITALS
WEIGHT: 192.02 LBS | HEART RATE: 69 BPM | OXYGEN SATURATION: 94 % | DIASTOLIC BLOOD PRESSURE: 49 MMHG | HEIGHT: 66 IN | TEMPERATURE: 97.7 F | RESPIRATION RATE: 26 BRPM | SYSTOLIC BLOOD PRESSURE: 127 MMHG | BODY MASS INDEX: 30.86 KG/M2

## 2025-01-20 LAB
ANION GAP SERPL CALC-SCNC: 8 MMOL/L (ref 2–12)
BASOPHILS # BLD: 0.06 K/UL (ref 0–0.1)
BASOPHILS NFR BLD: 0.4 % (ref 0–1)
BUN SERPL-MCNC: 27 MG/DL (ref 6–20)
BUN/CREAT SERPL: 26 (ref 12–20)
CALCIUM SERPL-MCNC: 9.6 MG/DL (ref 8.5–10.1)
CHLORIDE SERPL-SCNC: 101 MMOL/L (ref 97–108)
CO2 SERPL-SCNC: 29 MMOL/L (ref 21–32)
CREAT SERPL-MCNC: 1.02 MG/DL (ref 0.55–1.02)
DIFFERENTIAL METHOD BLD: ABNORMAL
EOSINOPHIL # BLD: 0.37 K/UL (ref 0–0.4)
EOSINOPHIL NFR BLD: 2.7 % (ref 0–7)
ERYTHROCYTE [DISTWIDTH] IN BLOOD BY AUTOMATED COUNT: 14.4 % (ref 11.5–14.5)
GLUCOSE BLD STRIP.AUTO-MCNC: 150 MG/DL (ref 65–117)
GLUCOSE BLD STRIP.AUTO-MCNC: 155 MG/DL (ref 65–117)
GLUCOSE SERPL-MCNC: 137 MG/DL (ref 65–100)
HCT VFR BLD AUTO: 30.2 % (ref 35–47)
HGB BLD-MCNC: 9.7 G/DL (ref 11.5–16)
IMM GRANULOCYTES # BLD AUTO: 0.18 K/UL (ref 0–0.04)
IMM GRANULOCYTES NFR BLD AUTO: 1.3 % (ref 0–0.5)
LYMPHOCYTES # BLD: 2.16 K/UL (ref 0.8–3.5)
LYMPHOCYTES NFR BLD: 15.5 % (ref 12–49)
MCH RBC QN AUTO: 32.6 PG (ref 26–34)
MCHC RBC AUTO-ENTMCNC: 32.1 G/DL (ref 30–36.5)
MCV RBC AUTO: 101.3 FL (ref 80–99)
MONOCYTES # BLD: 1.32 K/UL (ref 0–1)
MONOCYTES NFR BLD: 9.5 % (ref 5–13)
NEUTS SEG # BLD: 9.87 K/UL (ref 1.8–8)
NEUTS SEG NFR BLD: 70.6 % (ref 32–75)
NRBC # BLD: 0.04 K/UL (ref 0–0.01)
NRBC BLD-RTO: 0.3 PER 100 WBC
PLATELET # BLD AUTO: 276 K/UL (ref 150–400)
PMV BLD AUTO: 10.5 FL (ref 8.9–12.9)
POTASSIUM SERPL-SCNC: 3.2 MMOL/L (ref 3.5–5.1)
RBC # BLD AUTO: 2.98 M/UL (ref 3.8–5.2)
SERVICE CMNT-IMP: ABNORMAL
SERVICE CMNT-IMP: ABNORMAL
SODIUM SERPL-SCNC: 138 MMOL/L (ref 136–145)
WBC # BLD AUTO: 14 K/UL (ref 3.6–11)

## 2025-01-20 PROCEDURE — 6360000002 HC RX W HCPCS: Performed by: INTERNAL MEDICINE

## 2025-01-20 PROCEDURE — 6370000000 HC RX 637 (ALT 250 FOR IP): Performed by: INTERNAL MEDICINE

## 2025-01-20 PROCEDURE — 36415 COLL VENOUS BLD VENIPUNCTURE: CPT

## 2025-01-20 PROCEDURE — 2500000003 HC RX 250 WO HCPCS: Performed by: NURSE PRACTITIONER

## 2025-01-20 PROCEDURE — 6370000000 HC RX 637 (ALT 250 FOR IP): Performed by: HOSPITALIST

## 2025-01-20 PROCEDURE — 82962 GLUCOSE BLOOD TEST: CPT

## 2025-01-20 PROCEDURE — 2580000003 HC RX 258: Performed by: HOSPITALIST

## 2025-01-20 PROCEDURE — 6360000002 HC RX W HCPCS: Performed by: HOSPITALIST

## 2025-01-20 PROCEDURE — 85025 COMPLETE CBC W/AUTO DIFF WBC: CPT

## 2025-01-20 PROCEDURE — 80048 BASIC METABOLIC PNL TOTAL CA: CPT

## 2025-01-20 RX ORDER — VALSARTAN 160 MG/1
160 TABLET ORAL DAILY
Qty: 30 TABLET | Refills: 0 | Status: SHIPPED
Start: 2025-01-21

## 2025-01-20 RX ORDER — FUROSEMIDE 40 MG/1
40 TABLET ORAL 2 TIMES DAILY
Status: DISCONTINUED | OUTPATIENT
Start: 2025-01-20 | End: 2025-01-20

## 2025-01-20 RX ORDER — ASPIRIN 81 MG/1
81 TABLET, CHEWABLE ORAL DAILY
Qty: 30 TABLET | Refills: 0 | Status: SHIPPED
Start: 2025-01-21

## 2025-01-20 RX ORDER — FUROSEMIDE 40 MG/1
40 TABLET ORAL DAILY
Status: DISCONTINUED | OUTPATIENT
Start: 2025-01-21 | End: 2025-01-20 | Stop reason: HOSPADM

## 2025-01-20 RX ORDER — FUROSEMIDE 40 MG/1
40 TABLET ORAL DAILY
Qty: 30 TABLET | Refills: 0 | Status: SHIPPED
Start: 2025-01-21

## 2025-01-20 RX ORDER — POTASSIUM CHLORIDE 7.45 MG/ML
10 INJECTION INTRAVENOUS
Status: COMPLETED | OUTPATIENT
Start: 2025-01-20 | End: 2025-01-20

## 2025-01-20 RX ADMIN — ENOXAPARIN SODIUM 40 MG: 100 INJECTION SUBCUTANEOUS at 08:30

## 2025-01-20 RX ADMIN — POTASSIUM CHLORIDE 10 MEQ: 7.45 INJECTION INTRAVENOUS at 08:27

## 2025-01-20 RX ADMIN — SODIUM CHLORIDE, PRESERVATIVE FREE 10 ML: 5 INJECTION INTRAVENOUS at 08:28

## 2025-01-20 RX ADMIN — PIPERACILLIN AND TAZOBACTAM 3375 MG: 3; .375 INJECTION, POWDER, LYOPHILIZED, FOR SOLUTION INTRAVENOUS at 06:32

## 2025-01-20 RX ADMIN — POTASSIUM CHLORIDE 10 MEQ: 7.45 INJECTION INTRAVENOUS at 11:57

## 2025-01-20 RX ADMIN — VALSARTAN 160 MG: 160 TABLET, FILM COATED ORAL at 08:29

## 2025-01-20 RX ADMIN — Medication 1 AMPULE: at 08:29

## 2025-01-20 RX ADMIN — POTASSIUM CHLORIDE 10 MEQ: 7.45 INJECTION INTRAVENOUS at 10:46

## 2025-01-20 RX ADMIN — Medication: at 08:30

## 2025-01-20 RX ADMIN — POTASSIUM CHLORIDE 10 MEQ: 7.45 INJECTION INTRAVENOUS at 09:22

## 2025-01-20 RX ADMIN — ASPIRIN 81 MG: 81 TABLET, CHEWABLE ORAL at 08:29

## 2025-01-20 RX ADMIN — HYDROCHLOROTHIAZIDE 12.5 MG: 25 TABLET ORAL at 08:29

## 2025-01-20 RX ADMIN — AMLODIPINE BESYLATE 5 MG: 5 TABLET ORAL at 08:29

## 2025-01-20 RX ADMIN — TICAGRELOR 90 MG: 90 TABLET ORAL at 08:29

## 2025-01-20 NOTE — CARE COORDINATION
The accepting MD is Dr. Arti Avila  and report to be called to [phone].     Transition of Care Plan:    RUR: 15% Moderate RUR  Prior Level of Functioning: needs assistance   Disposition: [facility] [phone]  JACKIE: 1/20  If SNF or IPR: Date FOC offered: 1/20  Date FOC received: 1/20  Accepting facility: [facility] [phone]  Date authorization started with reference number: na  Date authorization received and expires: na  Follow up appointments: deferred to IPR  DME needed: deferred to IPR  Transportation at discharge: [facility] Phone: [phone] for 2 p.m.  IM/IMM Medicare/ letter given: 2ND IM 1/20  Is patient a  and connected with [facility]? NA   If yes, was [place] transfer form completed and [facility] notified? NA  Caregiver Contact: Lam Nicola,(Spouse) [phone]   Discharge Caregiver contacted prior to discharge? contacted  Care Conference needed? no  Barriers to discharge: none        IPR list was provided, emphasizing the patient/caregiver right to choose any agency/facility within network to meet their preference. The  has requested the selection of five preferred options within 24 hours to ensure a smooth discharge process and urges contacting CM once the choices are finalized to avoid any delays. Patients and caregivers are encouraged to ask questions, tour the facilities, and reach out for specific inquiries or accommodations. CM received the following choices:     [facility] Phone: [phone]    [facility] [phone]      CM received a call from Maribel Smith PT, [phone]  at [facility] [phone] confirming their ability to accommodate the patient today at 2 p.m. The accepting MD is Dr. Arti Avila  and report to be called to [phone]. CM

## 2025-01-20 NOTE — PLAN OF CARE
Problem: Confusion  Goal: Confusion, delirium, dementia, or psychosis is improved or at baseline  Description: INTERVENTIONS:  1. Assess for possible contributors to thought disturbance, including medications, impaired vision or hearing, underlying metabolic abnormalities, dehydration, psychiatric diagnoses, and notify attending LIP  2. [place] high risk fall precautions, as indicated  3. Provide frequent short contacts to provide reality reorientation, refocusing and direction  4. Decrease environmental stimuli, including noise as appropriate  5. Monitor and intervene to maintain adequate nutrition, hydration, elimination, sleep and activity  6. If unable to ensure safety without constant attention obtain sitter and review sitter guidelines with assigned personnel  7. Initiate Psychosocial CNS and Spiritual Care consult, as indicated  1/19/2025 1628 by Delilah Grant, RN  Outcome: Not Progressing     Problem: Discharge Planning  Goal: Discharge to home or other facility with appropriate resources  1/19/2025 2056 by Josef Carnes, RN  Outcome: Progressing  Flowsheets (Taken 1/19/2025 1711 by Delilah Grant, RN)  Discharge to home or other facility with appropriate resources: Identify barriers to discharge with patient and caregiver  1/19/2025 1628 by Delilah Grant, RN  Outcome: Not Progressing  Flowsheets (Taken 1/19/2025 0830)  Discharge to home or other facility with appropriate resources: Identify barriers to discharge with patient and caregiver     Problem: Confusion  Goal: Confusion, delirium, dementia, or psychosis is improved or at baseline  Description: INTERVENTIONS:  1. Assess for possible contributors to thought disturbance, including medications, impaired vision or hearing, underlying metabolic abnormalities, dehydration, psychiatric diagnoses, and notify attending LIP  2. [place] high risk fall precautions, as indicated  3. Provide frequent short contacts to provide reality 
  Problem: Safety - Adult  Goal: Free from fall injury  1/18/2025 1651 by Ilene Cohen RN  Outcome: Progressing  1/18/2025 0257 by Niyah Sanchez RN  Outcome: Progressing     Problem: Discharge Planning  Goal: Discharge to home or other facility with appropriate resources  1/18/2025 1651 by Ilene Cohen RN  Outcome: Progressing  1/18/2025 0257 by Niyah Sanchez RN  Outcome: Progressing     Problem: Pain  Goal: Verbalizes/displays adequate comfort level or baseline comfort level  1/18/2025 1651 by Ilene Cohen RN  Outcome: Progressing  1/18/2025 0257 by Niyah Sanchez RN  Outcome: Progressing     Problem: Respiratory - Adult  Goal: Achieves optimal ventilation and oxygenation  1/18/2025 1651 by Ilene Cohen RN  Outcome: Progressing  1/18/2025 0257 by Niyah Sanchez RN  Outcome: Progressing     Problem: Skin/Tissue Integrity  Goal: Absence of new skin breakdown  Description: 1.  Monitor for areas of redness and/or skin breakdown  2.  Assess vascular access sites hourly  3.  Every 4-6 hours minimum:  Change oxygen saturation probe site  4.  Every 4-6 hours:  If on nasal continuous positive airway pressure, respiratory therapy assess nares and determine need for appliance change or resting period.  1/18/2025 1651 by Ilene Cohen RN  Outcome: Progressing  1/18/2025 0257 by Niyah Sanchez RN  Outcome: Progressing     
  Problem: Safety - Adult  Goal: Free from fall injury  1/18/2025 2121 by Josef Carnes RN  Outcome: Progressing  1/18/2025 1651 by Ilene Cohen RN  Outcome: Progressing     Problem: Discharge Planning  Goal: Discharge to home or other facility with appropriate resources  1/18/2025 2121 by Josef Carnes RN  Outcome: Progressing  1/18/2025 1651 by Ilene Cohen RN  Outcome: Progressing     Problem: Pain  Goal: Verbalizes/displays adequate comfort level or baseline comfort level  1/18/2025 2121 by Josef Carnes RN  Outcome: Progressing  1/18/2025 1651 by Ilene Cohen RN  Outcome: Progressing     Problem: Respiratory - Adult  Goal: Achieves optimal ventilation and oxygenation  1/18/2025 2121 by Josef Carnes RN  Outcome: Progressing  1/18/2025 1651 by Ilene Cohen RN  Outcome: Progressing     Problem: Skin/Tissue Integrity  Goal: Absence of new skin breakdown  Description: 1.  Monitor for areas of redness and/or skin breakdown  2.  Assess vascular access sites hourly  3.  Every 4-6 hours minimum:  Change oxygen saturation probe site  4.  Every 4-6 hours:  If on nasal continuous positive airway pressure, respiratory therapy assess nares and determine need for appliance change or resting period.  1/18/2025 1651 by Ilene Cohen RN  Outcome: Progressing     
  Problem: Safety - Adult  Goal: Free from fall injury  1/20/2025 0808 by Raúl Cruz RN  Outcome: Progressing  1/19/2025 2056 by Josef Carnes RN  Outcome: Progressing     Problem: Discharge Planning  Goal: Discharge to home or other facility with appropriate resources  1/20/2025 0808 by Raúl Cruz RN  Outcome: Progressing  1/19/2025 2056 by Josef Carnes RN  Outcome: Progressing  Flowsheets (Taken 1/19/2025 1711 by Delilah Grant RN)  Discharge to home or other facility with appropriate resources: Identify barriers to discharge with patient and caregiver     Problem: Pain  Goal: Verbalizes/displays adequate comfort level or baseline comfort level  1/20/2025 0808 by Raúl Cruz RN  Outcome: Progressing  1/19/2025 2056 by Josef Carnes RN  Outcome: Progressing     Problem: Respiratory - Adult  Goal: Achieves optimal ventilation and oxygenation  1/20/2025 0808 by Raúl Cruz RN  Outcome: Progressing  1/19/2025 2056 by Josef Carnes RN  Outcome: Progressing     Problem: Skin/Tissue Integrity  Goal: Absence of new skin breakdown  Description: 1.  Monitor for areas of redness and/or skin breakdown  2.  Assess vascular access sites hourly  3.  Every 4-6 hours minimum:  Change oxygen saturation probe site  4.  Every 4-6 hours:  If on nasal continuous positive airway pressure, respiratory therapy assess nares and determine need for appliance change or resting period.  1/20/2025 0808 by Raúl Cruz RN  Outcome: Progressing  1/19/2025 2056 by Josef Carnes RN  Outcome: Progressing     Problem: Confusion  Goal: Confusion, delirium, dementia, or psychosis is improved or at baseline  Description: INTERVENTIONS:  1. Assess for possible contributors to thought disturbance, including medications, impaired vision or hearing, underlying metabolic abnormalities, dehydration, psychiatric diagnoses, and notify attending LIP  2. [place] high risk fall precautions, as 
  Problem: Safety - Adult  Goal: Free from fall injury  Outcome: Progressing     Problem: Discharge Planning  Goal: Discharge to home or other facility with appropriate resources  Outcome: Progressing     Problem: Pain  Goal: Verbalizes/displays adequate comfort level or baseline comfort level  Outcome: Progressing     Problem: Respiratory - Adult  Goal: Achieves optimal ventilation and oxygenation  Outcome: Progressing     Problem: Occupational Therapy - Adult  Goal: By Discharge: Performs self-care activities at highest level of function for planned discharge setting.  See evaluation for individualized goals.  Description: FUNCTIONAL STATUS PRIOR TO ADMISSION:  Per patient's , the patient is fully independent with ADLs, IADLs, ambulation driving and playing golf.   HOME SUPPORT: Patient was living with her .    Occupational Therapy Goals:  Initiated 1/16/2025  1.  Patient will perform grooming seated EOB with Moderate Assist within 7 day(s).  2.  Patient will perform upper body dressing with Maximal Assist within 7 day(s).  3.  Patient will perform self feeding with Moderate Assist within 7 day(s).  4.  Patient will perform BSC transfers with Moderate Assist  within 7 day(s).  5.  Patient will perform all aspects of toileting with Maximal Assist within 7 day(s).  6.  Patient will sponge bathing with Maximal Assist within 7 day(s).     1/17/2025 1418 by SdlElijah P, OTR/L  Outcome: Progressing     Problem: Physical Therapy - Adult  Goal: By Discharge: Performs mobility at highest level of function for planned discharge setting.  See evaluation for individualized goals.  Description: FUNCTIONAL STATUS PRIOR TO ADMISSION: Patient was independent and active without use of DME (per ).    HOME SUPPORT PRIOR TO ADMISSION: The patient lived with  but did not require assistance.    Physical Therapy Goals  Initiated 1/17/2025  1.  Patient will move from supine to sit and sit to supine in bed 
  Problem: Safety - Adult  Goal: Free from fall injury  Outcome: Progressing     Problem: Pain  Goal: Verbalizes/displays adequate comfort level or baseline comfort level  Outcome: Progressing     Problem: Safety - Medical Restraint  Goal: Remains free of injury from restraints (Restraint for Interference with Medical Device)  Description: INTERVENTIONS:  1. Determine that other, less restrictive measures have been tried or would not be effective before applying the restraint  2. Evaluate the patient's condition at the time of restraint application  3. Inform patient/family regarding the reason for restraint  4. Q2H: Monitor safety, psychosocial status, comfort, nutrition and hydration  Outcome: Progressing  Flowsheets  Taken 1/16/2025 0100  Remains free of injury from restraints (restraint for interference with medical device):   Determine that other, less restrictive measures have been tried or would not be effective before applying the restraint   Evaluate the patient's condition at the time of restraint application   Inform patient/family regarding the reason for restraint   Every 2 hours: Monitor safety, psychosocial status, comfort, nutrition and hydration  Taken 1/16/2025 0022  Remains free of injury from restraints (restraint for interference with medical device):   Determine that other, less restrictive measures have been tried or would not be effective before applying the restraint   Evaluate the patient's condition at the time of restraint application   Inform patient/family regarding the reason for restraint   Every 2 hours: Monitor safety, psychosocial status, comfort, nutrition and hydration  Taken 1/15/2025 2300  Remains free of injury from restraints (restraint for interference with medical device):   Determine that other, less restrictive measures have been tried or would not be effective before applying the restraint   Evaluate the patient's condition at the time of restraint application   Inform 
Patient remains oriented x 1 only.  Patient complaints of pain with turns and movement.  Patient up to the chair today with max assist x 2 with gait belt and leo steady.  Needed redirection and verbal direction throughout transfers from bed to chair and chair to bed.        Oxygen off today has been on room air and oxygen saturation has remained >90%.    [place] remains patent.    Patient given clear liquid tray today.  Took pills with applesauce.  Needs frequent encouragement for PO intake.  PO intake meal <25%.       Problem: Discharge Planning  Goal: Discharge to home or other facility with appropriate resources  Outcome: Not Progressing  Flowsheets (Taken 1/19/2025 0830)  Discharge to home or other facility with appropriate resources: Identify barriers to discharge with patient and caregiver     Problem: Confusion  Goal: Confusion, delirium, dementia, or psychosis is improved or at baseline  Description: INTERVENTIONS:  1. Assess for possible contributors to thought disturbance, including medications, impaired vision or hearing, underlying metabolic abnormalities, dehydration, psychiatric diagnoses, and notify attending LIP  2. [place] high risk fall precautions, as indicated  3. Provide frequent short contacts to provide reality reorientation, refocusing and direction  4. Decrease environmental stimuli, including noise as appropriate  5. Monitor and intervene to maintain adequate nutrition, hydration, elimination, sleep and activity  6. If unable to ensure safety without constant attention obtain sitter and review sitter guidelines with assigned personnel  7. Initiate Psychosocial CNS and Spiritual Care consult, as indicated  Outcome: Not Progressing     
balance which is impairing her functional independence. She is now functioning well below her fully independent and active baseline, currently total A for all ADLs and bed mobility.  At this time the patient will continue to benefit from acute OT and she will need further Rehab after discharge, SNF vs IPR, depending on an improvement in her ability to actively participate, as well as an improvement in her activity tolerance.           PLAN :  Recommendations and Planned Interventions:   self care training, therapeutic activities, functional mobility training, balance training, therapeutic exercise, endurance activities, cognitive retraining, patient education, home safety training, and family training/education    Frequency/Duration: OT Plan of Care: 3 times/week    Recommendation for discharge: (in order for the patient to meet his/her long term goals):   Continue to assess pending progress    Other factors to consider for discharge: patient's current support system is unable to meet their requirements for physical assistance, impaired cognition, high risk for falls, and not safe to be alone    IF patient discharges home will need the following DME: Continue to assess with progress.          OBJECTIVE DATA SUMMARY:   History reviewed. No pertinent past medical history.  Past Surgical History:   Procedure Laterality Date    CHOLECYSTECTOMY      ORTHOPEDIC SURGERY      back and foot          Expanded or extensive additional review of patient history:   Lives With: Spouse  Type of Home: House  Home Layout: One level  Home Access:  (1 threshhold step)        Bathroom Shower/Tub: Walk-in shower  Bathroom Toilet: Handicap height  Bathroom Equipment: Built-in shower seat, Grab bars around toilet     Home Equipment: Cane           Hand Dominance: right     EXAMINATION OF PERFORMANCE DEFICITS:    Cognitive/Behavioral Status:  Orientation  Overall Orientation Status: Impaired  Orientation Level: Oriented to 
therapy assess nares and determine need for appliance change or resting period.  Outcome: Progressing     Problem: Occupational Therapy - Adult  Goal: By Discharge: Performs self-care activities at highest level of function for planned discharge setting.  See evaluation for individualized goals.  Description: FUNCTIONAL STATUS PRIOR TO ADMISSION:  Per patient's , the patient is fully independent with ADLs, IADLs, ambulation driving and playing golf.   HOME SUPPORT: Patient was living with her .    Occupational Therapy Goals:  Initiated 1/16/2025  1.  Patient will perform grooming seated EOB with Moderate Assist within 7 day(s).  2.  Patient will perform upper body dressing with Maximal Assist within 7 day(s).  3.  Patient will perform self feeding with Moderate Assist within 7 day(s).  4.  Patient will perform BSC transfers with Moderate Assist  within 7 day(s).  5.  Patient will perform all aspects of toileting with Maximal Assist within 7 day(s).  6.  Patient will sponge bathing with Maximal Assist within 7 day(s).     1/16/2025 1657 by Elijah Garcia, OTR/L  Outcome: Progressing  1/16/2025 1652 by Elijah Garcia, OTR/L  Outcome: Progressing     
total A for bed mobility, mod/max A of 2 sit to/from stand and max A of 2 to side step to the HOB. In regard to ADLs she was min A/setup for self feeding, supervision/setup for grooming in supported sitting and she was total A for LB dressing. All VS were stable on 4 L NC t/o this session. At this time the patient will continue to benefit from acute OT and she will need IPR after discharge.              PLAN :  Patient continues to benefit from skilled intervention to address the above impairments.  Continue treatment per established plan of care to address goals.      Recommendation for discharge: (in order for the patient to meet his/her long term goals):   High intensity/comprehensive skilled occupational therapy in a multidisciplinary setting as patient is working towards tolerating up to 3 hours of therapy/day 5-7x/week    Other factors to consider for discharge: patient's current support system is unable to meet their requirements for physical assistance, poor safety awareness, impaired cognition, high risk for falls, not safe to be alone, and concern for safely navigating or managing the home environment    IF patient discharges home will need the following DME: continuing to assess with progress         OBJECTIVE DATA SUMMARY:   Cognitive/Behavioral Status:  Orientation  Overall Orientation Status: Impaired  Orientation Level: Oriented to person;Disoriented to place;Disoriented to time;Disoriented to situation  Cognition  Overall Cognitive Status: Exceptions  Arousal/Alertness: Appears intact  Attention Span: Attends with cues to redirect  Memory: Impaired  Safety Judgement: Impaired;Decreased awareness of need for safety  Problem Solving: Impaired  Insights: Impaired  Initiation: Requires cues for all  Sequencing: Requires cues for all    Functional Mobility and Transfers for ADLs:  Bed Mobility:  Bed Mobility Training  Bed Mobility Training: Yes  Supine to Sit: Total assistance;Assist X2  Sit to Supine: 
within 7 day(s).  4.  Patient will perform BSC transfers with Moderate Assist  within 7 day(s).  5.  Patient will perform all aspects of toileting with Maximal Assist within 7 day(s).  6.  Patient will sponge bathing with Maximal Assist within 7 day(s).     1/17/2025 1418 by Elijah Garcia, OTR/L  Outcome: Progressing     Problem: Physical Therapy - Adult  Goal: By Discharge: Performs mobility at highest level of function for planned discharge setting.  See evaluation for individualized goals.  Description: FUNCTIONAL STATUS PRIOR TO ADMISSION: Patient was independent and active without use of DME (per ).    HOME SUPPORT PRIOR TO ADMISSION: The patient lived with  but did not require assistance.    Physical Therapy Goals  Initiated 1/17/2025  1.  Patient will move from supine to sit and sit to supine in bed with moderate assistance within 7 day(s).    2.  Patient will perform sit to stand with minimal assistance within 7 day(s).  3.  Patient will transfer from bed to chair and chair to bed with minimal assistance using the least restrictive device within 7 day(s).  4.  Patient will ambulate with minimal assistance for 15 feet with the least restrictive device within 7 day(s).       1/17/2025 1421 by Jazmín Frazier, PT  Outcome: Progressing     
assess)    Range Of Motion:  AROM: Within functional limits       Functional Mobility:  Bed Mobility:     Bed Mobility Training  Bed Mobility Training: Yes  Supine to Sit: Total assistance;Assist X2  Sit to Supine: Total assistance;Assist X2  Transfers:     Transfer Training  Transfer Training: Yes  Overall Level of Assistance: Moderate assistance;Maximum assistance  Interventions: Verbal cues;Safety awareness training;Weight shifting training/pressure relief  Sit to Stand: Moderate assistance;Assist X2;Maximum assistance  Stand to Sit: Moderate assistance;Assist X2;Maximum assistance  Balance:               Balance  Sitting: Impaired  Sitting - Static: Fair (occasional)  Sitting - Dynamic: Fair (occasional)  Standing: Impaired  Standing - Static: Constant support;Fair;Poor  Standing - Dynamic: Constant support;Poor  Ambulation/Gait Training:                       Gait  Gait Training: Yes  Left Side Weight Bearing: Full  Right Side Weight Bearing: Full  Overall Level of Assistance: Maximum assistance;Assist X2  Distance (ft): 2 Feet (sidestepping to HOB)  Assistive Device:  (B HHA)  Interventions: Verbal cues;Safety awareness training;Weight shifting training/pressure relief  Base of Support: Widened  Speed/Andree: Pace decreased (< 100 feet/min)  Step Length: Left shortened;Right shortened  Gait Abnormalities: Decreased step clearance (significant forward trunk lean, decreased foot clearance R>L 2/2 RLE posterior to COG)                                                                                                                                                                                                                                                              [facility] AM-PAC®      Basic Mobility Inpatient Short Form (6-Clicks) Version 2  How much HELP from another person do you currently need... (If the patient hasn't done an activity recently, how much help from another person do you think they

## 2025-01-20 NOTE — PROGRESS NOTES
[facility]  [address] [place] [address]  [phone]  [employer]  CARDIOLOGY PROGRESS NOTE    1/14/2025 2:24 PM    Admit Date: 1/13/2025    Admit Diagnosis:   Cardiac arrest [I46.9]  STEMI (ST elevation myocardial infarction) (HCC) [I21.3]  ST elevation myocardial infarction (STEMI), unspecified artery (HCC) [I21.3]    Subjective:     Elsi Petersen was seen and examined at the bedside.  Unable to follow commands despite sedation holiday.  Discussed with nursing, not following commands.  Remains intubated    BP (!) 103/55   Pulse 74   Temp 100 °F (37.8 °C)   Resp 20   Ht 1.676 m (5' 6\")   Wt 88.6 kg (195 lb 5.2 oz)   SpO2 100%   BMI 31.53 kg/m²     Current Facility-Administered Medications   Medication Dose Route Frequency    propofol infusion  5-50 mcg/kg/min IntraVENous Continuous    sodium chloride flush 0.9 % injection 5-40 mL  5-40 mL IntraVENous 2 times per day    sodium chloride flush 0.9 % injection 5-40 mL  5-40 mL IntraVENous PRN    0.9 % sodium chloride infusion   IntraVENous PRN    ondansetron (ZOFRAN-ODT) disintegrating tablet 4 mg  4 mg Oral Q8H PRN    Or    ondansetron (ZOFRAN) injection 4 mg  4 mg IntraVENous Q6H PRN    polyethylene glycol (GLYCOLAX) packet 17 g  17 g Oral Daily PRN    acetaminophen (TYLENOL) tablet 650 mg  650 mg Oral Q6H PRN    Or    acetaminophen (TYLENOL) suppository 650 mg  650 mg Rectal Q6H PRN    insulin lispro (HUMALOG,ADMELOG) injection vial 0-8 Units  0-8 Units SubCUTAneous Q6H    glucose chewable tablet 16 g  4 tablet Oral PRN    dextrose bolus 10% 125 mL  125 mL IntraVENous PRN    Or    dextrose bolus 10% 250 mL  250 mL IntraVENous PRN    glucagon injection 1 mg  1 mg SubCUTAneous PRN    dextrose 10 % infusion   IntraVENous Continuous PRN    norepinephrine (LEVOPHED) 16 mg in sodium chloride 0.9 % 250 mL infusion  0.5-20 mcg/min IntraVENous Continuous    pantoprazole (PROTONIX) 40 mg in sodium 
      Hospitalist Progress Note    NAME:   Elsi Petersen   : 1950   MRN: [id number]     Date/Time: 2025 2:34 PM  Patient PCP: Pily Nguyễn MD    Estimated discharge date: >48 hrs  Barriers: clinical stability, eventual placement      Assessment / Plan:    Cardiopulmonary arrest (Initial rhythm PEA per EMS, obtained ROSC after approximately 10 minutes of CPR. Subsequently went into V. tach arrest was given 1 mg of epi with ROSC)  Inferior wall MI s/p PCI and no stenting 25   Circulatory shock, cardiogenic. Resolved   - TTE 1/15/25 normal systolic function, abnormal diastolic function  - Off pressors   Plan  -Post cardiac arrest care   -Anti platelet & long term anti ischemic therapy per cardio     Anoxic brain injury / cardiac arrest. Mentation has significantly improved   - EEG with no seizure   Plan  Aspiration precautions   Swallow eval     Acute respiratory failure, intubated at time of cardiac arrest and extubated 25   Cardiogenic pul edema vs pneumonia   - Empirically treated for pneumonia (Procal 0.68 on 25)  - Looks euvolemic and TTE with good systolic function  Plan  - Post extubation care   - Continue Zosyn   - Repeat Procal, BNP  - start Lasix      FIDENCIO in setting of cardiac arrest. Non oliguric now. Resolved   Plan  - Monitor and document daily urine out put with accurate Is and Os  - Please document daily weight.   - Avoid nephrotoxic agents.     High NG out put, resolved   Abd distension, resolved with NG suction. Having bowel movements   Plan  Continue bowel regimen  Swallow eval and feed as tolerated   KUB with ileus vs start of bowel obstruction, NPO for now-sips with meds, encourage OOB to chair, AM KUB repeat     Dropping HB, no apparent bleeding. Hemodilution vs true anemia   Plan  - Follow iron studies, hemolytic w/u, folate and B12 tomorrow AM  - follow Hb daily, transfuse for Hb < 7    Medical Decision Making:   I personally reviewed labs: CBC, BMP  I 
      Hospitalist Progress Note    NAME:   Elsi Petersen   : 1950   MRN: [id number]     Date/Time: 2025 11:31 AM  Patient PCP: Pily Nguyễn MD    Estimated discharge date: 25  Barriers: clinical stability, eventual placement      Assessment / Plan:    Cardiopulmonary arrest (Initial rhythm PEA per EMS, obtained ROSC after approximately 10 minutes of CPR. Subsequently went into V. tach arrest was given 1 mg of epi with ROSC)  Inferior wall MI s/p PCI and no stenting 25   Circulatory shock, cardiogenic. Resolved   - TTE 1/15/25 normal systolic function, abnormal diastolic function  - Off pressors   Plan  -Post cardiac arrest care   -Anti platelet & long term anti ischemic therapy per cardio     Anoxic brain injury /2 cardiac arrest. Mentation has significantly improved   - EEG with no seizure   Plan  Aspiration precautions   Swallow eval     Acute respiratory failure, intubated at time of cardiac arrest and extubated 25   Cardiogenic pul edema vs pneumonia   - Empirically treated for pneumonia (Procal 0.68 on 25)  - Looks euvolemic and TTE with good systolic function  Plan  - Post extubation care   - Continue Zosyn   - Repeat Procal, BNP  -continue Lasix      FIDENCIO in setting of cardiac arrest. Non oliguric now. Resolved   - Monitor and document daily urine out put with accurate Is and Os  - Avoid nephrotoxic agents.     High NG out put, resolved   Abd distension, resolved with NG suction. Having bowel movements   Continue bowel regimen  Swallow eval and feed as tolerated   KUB with ileus vs start of bowel obstruction,- repeat KUB shows mild ileus with improvement  Start clear liquid diet     Acute hypokalemia:  Will replete potassium    Medical Decision Making:   I personally reviewed labs: CBC- wbc stable at 14.2, Hb stable at 9.8, BMP- low potassium 3- will replete potassium  I personally reviewed imaging:I personally reviewed EKG:  Toxic drug monitoring:   Discussed case 
     [facility] PROGRESS NOTE.      Name: Elsi Petersen   : 1950   MRN: [id number]   Date: 2025      Chief Complaint   Patient presents with    Cardiac Arrest     Reason for ICU admission:   -S/p cardiopulmonary arrest  -Inferior STEMI s/p PCI  -Concern for possible anoxic brain injury  -Acute kidney injury    Hospital Course:     73 y/o female PMHx of hypertension who presented to ED for out-of-hospital cardiac arrest.  Per report patient had collapsed at home,  started CPR.  Initial rhythm PEA per EMS, obtained ROSC after approximately 10 minutes. Subsequently went into V. tach arrest was given 1 mg of epi with ROSC.  On ED arrival, no withdrawal to tactile stimuli,.  CT head-negative.  EKG demonstrated inferior STEMI.  CT head-negative, CXR-pulmonary edema. taken emergently to the Cath Lab demonstrating distal LAD lesion, s/p angioplasty, no stent was placed due to distal location of lesion and small size of vessel.   Admitted in ICU post PCI, intubated and sedated on Levo and dobutamine.     25 - high NG out put up to 1L, distended abdomen. Off dobutamine and on Levo only. Making urine. Afebrile. Rising WBC count. Started on Zosyn     1/15/25 - More awake, intermittently opens her eyes and follow commands. Making more urine now. CR improved. Afebrile     25 - more awake, strong and follow commands. No significant resp secretions. She is afebrile. Off pressors . Extubated to nasal canula this AM     25 - NO issues post extubation, stays on 4L nasal canula. Afebrile, WBC count stable. More Awake but still very slow to respond. No hemodynamic issue, BP on higher side. Starting home meds. On nasal canula 4L, CXR with mild interstitial edema. Starting lasix IV    Assessment & Plan     # Cardiopulmonary arrest (Initial rhythm PEA per EMS, obtained ROSC after approximately 10 minutes of CPR. Subsequently went into V. tach arrest was given 1 mg of epi with ROSC)  # Inferior 
     [facility] PROGRESS NOTE.      Name: Elsi Petersen   : 1950   MRN: [id number]   Date: 1/15/2025      Chief Complaint   Patient presents with    Cardiac Arrest       Reason for ICU admission:   -S/p cardiopulmonary arrest  -Inferior STEMI s/p PCI  -Concern for possible anoxic brain injury  -Acute kidney injury    Hospital Course:     73 y/o female PMHx of hypertension who presented to ED for out-of-hospital cardiac arrest.  Per report patient had collapsed at home,  started CPR.  Initial rhythm PEA per EMS, obtained ROSC after approximately 10 minutes. Subsequently went into V. tach arrest was given 1 mg of epi with ROSC.  On ED arrival, no withdrawal to tactile stimuli,.  CT head-negative.  EKG demonstrated inferior STEMI.  CT head-negative, CXR-pulmonary edema. taken emergently to the Cath Lab demonstrating distal LAD lesion, s/p angioplasty, no stent was placed due to distal location of lesion and small size of vessel.   Admitted in ICU post PCI, intubated and sedated on Levo and dobutamine.     25 - high NG out put up to 1L, distended abdomen. Off dobutamine and on Levo only. Making urine. Afebrile. Rising WBC count. Started on Zosyn     1/15/25 - More awake, intermittently opens her eyes and follow commands. Making more urine now. CR improved. Afebrile     Assessment & Plan     # S/p cardiopulmonary arrest (Initial rhythm PEA per EMS, obtained ROSC after approximately 10 minutes. Subsequently went into V. tach arrest was given 1 mg of epi with ROSC)  # Inferior wall MI s/p PCI and no stenting 25   # Circulatory shock, cardiogenic. Resolved   - Pending TTE and cardio following   - Off pressors now   Plan  -Hemodynamic support for MAP goal > 65  -Anti platelet therapy per cardio    # Anoxic brain injury 2/2 cardiac arrest   - moving extremities and intermittently opens her eyes   - currently off sedation, mentation improved   - EEG with no seizure   Plan  Continue supportive 
     [facility] PROGRESS NOTE.      Name: Elsi Petersen   : 1950   MRN: [id number]   Date: 2025      Chief Complaint   Patient presents with    Cardiac Arrest     Reason for ICU admission:   -S/p cardiopulmonary arrest  -Inferior STEMI s/p PCI  -Concern for possible anoxic brain injury  -Acute kidney injury    Hospital Course:     73 y/o female PMHx of hypertension who presented to ED for out-of-hospital cardiac arrest.  Per report patient had collapsed at home,  started CPR.  Initial rhythm PEA per EMS, obtained ROSC after approximately 10 minutes. Subsequently went into V. tach arrest was given 1 mg of epi with ROSC.  On ED arrival, no withdrawal to tactile stimuli,.  CT head-negative.  EKG demonstrated inferior STEMI.  CT head-negative, CXR-pulmonary edema. taken emergently to the Cath Lab demonstrating distal LAD lesion, s/p angioplasty, no stent was placed due to distal location of lesion and small size of vessel.   Admitted in ICU post PCI, intubated and sedated on Levo and dobutamine.     25 - high NG out put up to 1L, distended abdomen. Off dobutamine and on Levo only. Making urine. Afebrile. Rising WBC count. Started on Zosyn     1/15/25 - More awake, intermittently opens her eyes and follow commands. Making more urine now. CR improved. Afebrile     25 - more awake, strong and follow commands. No significant resp secretions. She is afebrile. Off pressors . Extubated to nasal canula this AM     Assessment & Plan     # S/p cardiopulmonary arrest (Initial rhythm PEA per EMS, obtained ROSC after approximately 10 minutes. Subsequently went into V. tach arrest was given 1 mg of epi with ROSC)  # Inferior wall MI s/p PCI and no stenting 25   # Circulatory shock, cardiogenic. Resolved   - TTE 1/15/25 normal systolic function, abnormal diastolic function  - Off pressors   Plan  -post cardiac arrest care   -Anti platelet & long term anti ischemic therapy per cardio    # 
     [facility] PROGRESS NOTE.      Name: Elsi Petersen   : 1950   MRN: [id number]   Date: 2025      Chief Complaint   Patient presents with    Cardiac Arrest       Reason for ICU admission:   -S/p cardiopulmonary arrest  -Inferior STEMI s/p PCI  -Concern for possible anoxic brain injury  -Acute kidney injury    Hospital Course:     73 y/o female PMHx of hypertension who presented to ED for out-of-hospital cardiac arrest.  Per report patient had collapsed at home,  started CPR.  Initial rhythm PEA per EMS, obtained ROSC after approximately 10 minutes. Subsequently went into V. tach arrest was given 1 mg of epi with ROSC.  On ED arrival, no withdrawal to tactile stimuli,.  CT head-negative.  EKG demonstrated inferior STEMI.  CT head-negative, CXR-pulmonary edema. taken emergently to the Cath Lab demonstrating distal LAD lesion, s/p angioplasty, no stent was placed due to distal location of lesion and small size of vessel.   Admitted in ICU post PCI, intubated and sedated on Levo and dobutamine.     25 - high NG out put up to 1L, distended abdomen. Off dobutamine and on Levo only. Making urine. Afebrile. Rising WBC count    Assessment & Plan     # S/p cardiopulmonary arrest  # Inferior wall MI s/p PCI and no stenting   # Circulatory shock, cardiogenic   - Pending TTE and cardio following   - On Levo at 8 mcg  Plan  Wean levophed     # Anoxic brain injury 2/2 cardiac arrest   Plan  Continue supportive vent and hemodynamic management     # Acute respiratory failure, intubated  Plan  - Continue with lung protection ventilator strategy per protocol with following parameters as needed; please call if goals not met.    Maintain Vt 4-8 cc/kg per IBW   Maintain Pplat < 30 cm H2O if possible   Maintain driving P < 15 cm H2O if possible   Optimize PEEP for FiO2 less than 60% for goal SpO2 > 90%   Permissive hypercapnia, if necessary, as long as Ph > 7.2    # FIDENCIO in setting of cardiac arrest. 
   01/15/25 0521   B: Both Spontaneous Awakening and Breathing Trials   Was Patient Receiving Mechanical Ventilation Yes   Safety Screening Spontaneous Breathing Trial (SBT) Proceed with SBT - no exclusion criteria met   Spontaneous Breathing Trial (SBT) Outcome SBT Passed   Patient Meet Extubation Criteria   (Pt not alert or following commands, OLIVIA Goldman NP aware)   Weaning Parameters   Respiratory Rate Observed 17   Ve 6.1      RSBI 48     Per OLIVIA Goldman NP, okay to leave pt on SBT CPAP/PS settings. No extubation at this time. RN aware.  
 Neurology Note    Patient ID:  Elsi Petersen  [id number]  74 y.o.  1950      Date of Consultation:  January 16, 2025      Assessment and Plan:    74 year old female admitted with out of hospital cardiac arrest.  Patient now extubated. Current examination reveals pupil reactivity, no focal sensory or motor deficit.  Mild generalized weakness. Will follow simple commands.      Cardiac arrest with concern for anoxic brain injury:  Improving mental status  Initial head ct with no acute abnormality  No increasing seizure burden from initial eeg  full channel EEG with mild generalized slowing  Patient is more responsive today  Continue correcting metabolic derangements  Optimize nutrition  Will need pt, ot, speech therapy.  After discharge, will need close neurological follow up and additional cognitive testing      Cardiac arrest  Inferior STEMI  Acute respiratory failure  Acute kidney injury  Elevated lfts-improving    There is no other additional neurology recommendations at this time.  If questions arise, please not hesitate to contact me and I will return to see the patient.            Subjective: minimal verbal output.  Extubated earlier this morning       History of Present Illness:   Elsi Petersen is a 74 y.o. female with a history of hypertension who presented to the emergency department after an out-of-hospital cardiac arrest.  The patient's  started CPR.  After EMS arrived, the patient was initially in PEA and had return of spontaneous circulation after approximately 10 minutes.  Patient then went into V. tach and was given epinephrine.  Patient was intubated and admitted to the ICU.  Rapid EEG from overnight revealed no increasing seizure burden.  Patient was seen by cardiology and underwent emergent catheterization    Since admission, there has been no new events.  Patient was extubated this morning.     head CT from January 13, 2025.  There is no acute intracranial abnormality    Past medical 
 participated in the Interdisciplinary rounds. Patient's medical care was discussed during rounds.     Rev. JAMES Williamson, [facility]   Paging Service [phone])    
0700- Bedside and Verbal shift change report given to Donna CHATTERJEE (oncoming nurse) by Heriberto FORDE RN (offgoing nurse). Report included the following information Index, Intake/Output, MAR, and Recent Results.     0740- Dr. Smith at the bedside.Pt is alert, open eyes spontaneously, able to move all extremities, withdraws from pain, and able to follow commands. Patient is off sedations and is on spontaneous breathing trials. Plan to extubate patient now. RT notified.     0750- Pt is extubated to 4L NC. Pt is tolerating well so far.     0800- Shift assessment completed. VS stable; cardiac rhythm is NSR; Horton intact with adequate U/O See flowsheet for details.     1200- Horton removed. Reassessment completed, no change to previous assessment.     1600- Reassessment completed, no change to previous assessment.     1800- Patient has not voided. Bladder scan showed 441cc in the bladder. Straight cath patient one time, drained 400cc of clear, ester urine.    1900- Bedside and Verbal shift change report given to Jeannette JOHNSON RN (oncoming nurse) by Donna CHATTERJEE (offgoing nurse). Report included the following information Index, Intake/Output, MAR, and Recent Results.   
0700- Bedside and Verbal shift change report given to Donna CHATTERJEE (oncoming nurse) by Martha FORDE RN (offgoing nurse). Report included the following information Index, Intake/Output, MAR, and Recent Results.     0800- Shift assessment completed. Pt is alert, open eyes spontaneously, able to move all extremities, withdraws from pain but is unable to follow any commands. Patient is off sedations and is on spontaneous breathing trials. VS stable; cardiac rhythm is NSR; Horton intact with adequate U/O See flowsheet for details.     1225- Patient is more awake and agitated. Pt desated to 70's and 80's. Suctioned her few times. Patient continues to be agitated. RT at the bedside. Dr. Smith notified. Pt is back on ventilator support. Will start pt on precedex as per Dr. Smith.     1304-Started pt on precedex gtt.    1410- Pt. BP is low. Stopped precedex gtt for now.     1425- Patient is restless and agitated again. Pt back on precedex gtt now.    1815- MAP continues to be less <65; started Levophed gtt as per Dr. Cooper's order.    1910-  Bedside and Verbal shift change report given to Heriberto CHATTERJEE (oncoming nurse) by Donna CHATTERJEE (offgoing nurse). Report included the following information Index, Intake/Output, MAR, and Recent Results.     
0745  Bedside and Verbal shift change report given to SHENA Buchanan (oncoming nurse) by SHENA Lott (offgoing nurse). Report included the following information Adult Overview, Intake/Output, MAR, Recent Results, and Cardiac Rhythm NSR .     0800  Assessment as per flow.  Dr. Freeman in to examine patient.    0845  EEG at bedside.  Propofol paused.    1040  Interdisciplinary rounds.   at bedside and updated. Dr. Smith will follow up with him.    1200  Assessment unchanged.  APAP given for increasing temperature.      1720  Spoke with Dr. Smith about temperature  Pt did not respond to previous APAP dose.  He will place order for APAP 1000 mg Q8 hrs. Dr. Smith at bedside updating  and son..     1945  Bedside and Verbal shift change report given to SHENA Thomas (oncoming nurse) by SHENA Buchanan (offgoing nurse). Report included the following information Adult Overview, Intake/Output, Recent Results, and Cardiac Rhythm NSR .                  
1400: Patient was discharged and [facility]. Report given to Gianfranco. Transport was set up . 1    1430: Patient got out of the unit with lazcano. Verified with Dr. Booker if she is going with the lazcano, she said she can go with the lazcano.  
1900 Bedside shift change report given to SHENA Machado (oncoming nurse) by SHENA Thompson (offgoing nurse). Report included the following information Nurse Handoff Report, Adult Overview, Intake/Output, MAR, and Med Rec Status.     2000 Shift assessment completed, see flowsheets for details. Patient confused, frequently pulling off nasal cannula. Verbal redirection provided.     2130 Incontinence care provided, new purewick placed. Patient has not voided since lazcano removal. Last straight cath at 1800. Will continue to monitor and bladder scan as appropriate.    0000 Reassessment completed, no change to previous assessment. Has not voided, bladder scan showing 342mL - will reassess per urinary retention policy.    0400 Reassessment completed, no change to previous assessment. Straight cath performed for bladder scan > 430mL.    0700 Bedside shift change report given to SHENA Calabrese (oncoming nurse) by SHENA Machado (offgoing nurse). Report included the following information Nurse Handoff Report, Adult Overview, Intake/Output, MAR, Recent Results, and Cardiac Rhythm SR-ST .     
2357- Report received from SHENA Dee.    0015- Patient arrived to CCU.  Patent hemostasis preformed. Shift assessment preformed.    0039- Labs drawn and sent.    0100- MICHELLE Patel contacted about low BP.  Orders received to start a Levo gtt.    0104- LR bolus started.    0105- Temperature cord hooked to lazcano for targeted temperature management.    0115- EKG completed.    0150- Rapid EEG test started.    0200- CHG bath completed.  Dual skin assessment preformed.  IO catheters removed.    0300- Attempted to remove TR band.  After removal of 1 cc hematoma started to develop.  Re instilled air per protocol.    0350- Rapid EEG recording completed.    0355- Attempted to remove TR band.  After removal of 2 cc of air a small amount of bleeding started.  Re instilled air per protocol.    0356- Reassessed.  No changes.    0415- Labs drawn and sent.    0515- ABG collected by MICHELLE Patel and given to respiratory.  1 cc of air removed from TR band.    0520- 1 cc of air removed from TR band.    0525- 1 cc of air removed from TR band.    0530- 1 cc of air removed from the TR band.    0535- 1 cc of air removed from the TR band.    0540- 1 cc of air removed from the TR band.  Notified MICHELLE Patel of critical lactic acid levels. Orders received.    0545- 1 cc of air removed from the TR band.    0550- 1 cc of air removed from the TR band.    0555- 1 cc of air removed from the TR band.  Patient has a dime-sized hematoma.    0700- Report given to the oncoming shift.  
4 Eyes Skin Assessment     NAME:  Elsi Petersen  YOB: 1950  MEDICAL RECORD NUMBER:  [id number]    The patient is being assessed for  Transfer to New Unit    I agree that at least one RN has performed a thorough Head to Toe Skin Assessment on the patient. ALL assessment sites listed below have been assessed.      Areas assessed by both nurses:    Head, Face, Ears, Shoulders, Back, Chest, Arms, Elbows, Hands, Sacrum. Buttock, Coccyx, Ischium, and Legs. Feet and Heels        Does the Patient have a Wound? Yes wound(s) were present on assessment. LDA wound assessment was Initiated and completed by RN       Fede Prevention initiated by RN: Yes  Wound Care Orders initiated by RN: Yes    Pressure Injury (Stage 3,4, Unstageable, DTI, NWPT, and Complex wounds) if present, place Wound referral order by RN under : Yes    New Ostomies, if present place, Ostomy referral order under : No     Nurse 1 eSignature: Electronically signed by Ilene Cohen RN on 1/17/25 at 5:50 PM EST    **SHARE this note so that the co-signing nurse can place an eSignature**    Nurse 2 eSignature: Electronically signed by Delilah Grant RN on 1/17/25 at 6:35 PM EST   
Bedside and Verbal shift change report given to Allison (oncoming nurse) by Jeannette (offgoing nurse). Report included the following information Nurse Handoff Report, MAR, Recent Results, and Cardiac Rhythm NSR to low sinus tachy .     A/B: saturating mid 90s on 4L NC. No acute distress  C: SBPs in 140s-150s; no pressors. NSR to low sinus tachy on the monitor    Patient is alert. No acute distress noted.  Overnight events:  -straight cath at 0330 (2nd consecutive time in 24hrs)  -small BM x2 overnight  -patient did not sleep last night  - potassium was 3.4 this am.  To get EfferK 40mEq      0949- IDRs  Pt to be diuresed with Lasix based on CXR this am  Plan to place lazcano catheter for strict I+Os, patient has also been retaining urine  Resume home antihypertensives  Melatonin added for sleep  Pt son in rounds, patient  at bedside  Patient will transfer out to stepdown    1145- Lazcano inserted per order.  Patient cries out \"no, no\" with turns.  Says yes when asked if she has pain in her back.  Told the patient I would bring her some Tylenol.  \"No.\" Asked patient if she takes anything at home.  Patient stated Tylenol, then clearly stated \"I do not want to take Tylenol.\"  Declined offer of a lidocaine patch.  Also declined to take her blood pressure pills at this time, although did receive furosemide IV without issue.    1248 PT/OT at bedside.    1515 Patient has bed assignment 2301. Attempted to call report. Receiving nurse to return call.    3:54 PM  TRANSFER - OUT REPORT:    Verbal report given to SHENA Odom on Elsi Petersen  being transferred to PCU for routine progression of patient care       Report consisted of patient's Situation, Background, Assessment and   Recommendations(SBAR).     Information from the following report(s) Nurse Handoff Report, MAR, Recent Results, and Cardiac Rhythm NSR  was reviewed with the receiving nurse.           Lines:   Peripheral IV 01/13/25 Left;Proximal Antecubital 
Bedside shift change report given to Josef CHATTERJEE (oncoming nurse) by Delilah CHATTERJEE (offgoing nurse). Report included the following information Nurse Handoff Report, Index, Intake/Output, MAR, Recent Results, Cardiac Rhythm  , Neuro Assessment, and Event Log.                              End of Shift Note    Bedside shift change report given to Raúl CHATTERJEE (oncoming nurse) by Josef Carnes RN (offgoing nurse).  Report included the following information SBAR, Kardex, Intake/Output, MAR, Recent Results, Cardiac Rhythm  , and Alarm Parameters     Shift worked:  7pm-7am     Shift summary and any significant changes:     Had large watery Bmx1, V/S stable, lab works, observation and care continues     Concerns for physician to address:  xxx     Zone phone for oncoming shift:   xxx       Activity:  Level of Assistance: Dependent, patient does less than 25%  Number times ambulated in hallways past shift: 0  Number of times OOB to chair past shift: 0    Cardiac:   Cardiac Monitoring: Yes      Cardiac Rhythm: Sinus rhythm    Access:  Current line(s): PIV     Genitourinary:   Urinary Status: Horton    Respiratory:   O2 Device: None (Room air)  Chronic home O2 use?: NO  Incentive spirometer at bedside: NO    GI:  Last BM (including prior to admit): 01/19/25  Current diet:  ADULT DIET; Clear Liquid  Passing flatus: YES    Pain Management:   Patient states pain is manageable on current regimen: YES    Skin:  Fede Scale Score: 14  Interventions: Wound Offloading (Prevention Methods): Repositioning, Pillows, Turning    Patient Safety:  Fall Risk: Nursing Judgement-Fall Risk High(Add Comments): Yes  Fall Risk Interventions  Nursing Judgement-Fall Risk High(Add Comments): Yes  Toilet Every 2 Hours-In Advance of Need: Yes  Hourly Visual Checks: In bed, Awake  Fall Visual Posted: Socks  Room Door Open: Yes  Alarm On: Bed  Patient Moved Closer to Nursing Station: Yes    Active Consults:   IP CONSULT TO CARDIOLOGY  IP CONSULT TO 
Bedside shift change report given to Josef CHATTERJEE (oncoming nurse) by Ilene CHATTERJEE (offgoing nurse). Report included the following information Nurse Handoff Report, Index, Intake/Output, MAR, Recent Results, Cardiac Rhythm  , Neuro Assessment, and Event Log.            End of Shift Note    Bedside shift change report given to Delilah CHATTERJEE (oncoming nurse) by Josef Carnes RN (offgoing nurse).  Report included the following information SBAR, Kardex, ED Summary, Intake/Output, MAR, Recent Results, and Cardiac Rhythm      Shift worked:  7pm-7am     Shift summary and any significant changes:     Remained on 2L O2 via N/C, V/S stable, lab works done, condition stable, observation and care continues     Concerns for physician to address:  xxx     Zone phone for oncoming shift:   xxx       Activity:  Level of Assistance: Dependent, patient does less than 25%  Number times ambulated in hallways past shift: 0  Number of times OOB to chair past shift: 0    Cardiac:   Cardiac Monitoring: Yes      Cardiac Rhythm: Sinus rhythm    Access:  Current line(s): PIV     Genitourinary:   Urinary Status: Horton, Patent    Respiratory:   O2 Device: Nasal cannula  Chronic home O2 use?: NO  Incentive spirometer at bedside: NO    GI:  Last BM (including prior to admit): 01/18/25  Current diet:  Diet NPO Exceptions are: Sips of Water with Meds  Passing flatus: YES    Pain Management:   Patient states pain is manageable on current regimen: YES    Skin:  Fede Scale Score: 13  Interventions: Wound Offloading (Prevention Methods): Repositioning, Pillows, Turning    Patient Safety:  Fall Risk: Nursing Judgement-Fall Risk High(Add Comments): Yes  Fall Risk Interventions  Nursing Judgement-Fall Risk High(Add Comments): Yes  Toilet Every 2 Hours-In Advance of Need: Yes  Hourly Visual Checks: Eyes closed, In bed  Fall Visual Posted: Socks  Room Door Open: Yes  Alarm On: Bed  Patient Moved Closer to Nursing Station: No    Active Consults:   IP CONSULT TO 
Bedside shift change report given to SHENA Odom (oncoming nurse) by SHENA Linder (offgoing nurse). Report included the following information Nurse Handoff Report.      0918--Verbal orders given to order normal saline at 50 mL/hr by Dr. Phelps. Per MD, funmi to give patient sips of water with meds or crush meds in apple sauce. Otherwise, patient is to be NPO.     0931--Verbal orders given to order 40 mEq of IV potassium chloride by Dr. Phelps.     1654--Per funmi Ozuna for patient to have sips of water as long as no vomiting noted. Changed diet order per orders.     End of Shift Note    Bedside shift change report given to SHENA Bahena (oncoming nurse) by Ilene Cohen RN (offgoing nurse).  Report included the following information SBAR and cardiac rhythm NSR    Shift worked:  0647-0463     Shift summary and any significant changes:     Mentation showing signs of improvement today. Patient knew first and last name as well as some of birthday. Despite not being able to answer the rest of the orientation questions, she has been participating in conversation more, especially with  and son. She is still repeating things people say to her, but not as much as yesterday.     Follow-up x-ray done and MD aware of results. Per MD, keeping NPO besides sips of water and medications. IV fluids going. Potassium replaced per orders. New PIV placed. Horton has had significant output today.     MD notified of small lump near right radial artery area. Pressure dressing applied.    Concerns for physician to address:  --PT/OT recommendations  --repeat abdominal x-ray?   Zone phone for oncoming shift:          Activity:  Level of Assistance: Dependent, patient does less than 25%  Number times ambulated in hallways past shift: 0  Number of times OOB to chair past shift: 0    Cardiac:   Cardiac Monitoring: Yes      Cardiac Rhythm: Sinus rhythm    Access:  Current line(s): PIV     Genitourinary:   Urinary Status: Clifford, Patent, 
Bedside shift report received.  
Critical care interdisciplinary rounds today.  Following members present: Case Management, , Clinical Lead, Diabetes Team, Nursing, Nutrition, Pharmacy, and Physician. Family invited to participate.  Plan of care discussed.  See clinical pathway for plan of care and interventions and desired outcomes.     Extubated this am.  Horton to be discontinued.  
EEG complete.  
End of Shift Note    Bedside shift change report given to Ilene (oncoming nurse) by Niyah Sanchez RN (offgoing nurse).  Report included the following information SBAR, Kardex, Intake/Output, MAR, and Recent Results    Shift worked:  3194-6955     Shift summary and any significant changes:     No acute changes. Pt NPO following KUB results overnight-repeat scheduled for AM. Pt still A/O x1, repeating same words and phrases with no meaningful conversation.      Concerns for physician to address:  none     Zone phone for oncoming shift:          Activity:  Level of Assistance: Maximum assist, patient does 25-49%  Number times ambulated in hallways past shift: 0  Number of times OOB to chair past shift: 0    Cardiac:   Cardiac Monitoring: Yes      Cardiac Rhythm: Sinus rhythm    Access:  Current line(s): PIV     Genitourinary:   Urinary Status: Draining, Horton    Respiratory:   O2 Device: Nasal cannula  Chronic home O2 use?: NO  Incentive spirometer at bedside: NO    GI:  Last BM (including prior to admit): 01/17/25  Current diet:  Diet NPO  Passing flatus: YES    Pain Management:   Patient states pain is manageable on current regimen: N/A    Skin:  Fede Scale Score: 13  Interventions: Wound Offloading (Prevention Methods): Bed, pressure reduction mattress, Elevate heels, Pillows, Repositioning, Turning    Patient Safety:  Fall Risk: Nursing Judgement-Fall Risk High(Add Comments): Yes  Fall Risk Interventions  Nursing Judgement-Fall Risk High(Add Comments): Yes  Toilet Every 2 Hours-In Advance of Need: Yes  Hourly Visual Checks: Eyes closed, In bed, Quiet  Fall Visual Posted: Fall sign posted, Socks  Room Door Open: Yes  Alarm On: Bed  Patient Moved Closer to Nursing Station: No    Active Consults:   IP CONSULT TO CARDIOLOGY  IP CONSULT TO INTENSIVIST  IP CONSULT TO NEUROLOGY    Length of Stay:  Expected LOS: 9  Actual LOS: 4    Niyah Sanchez RN                           
End of Shift Note    Bedside shift change report given to Josef CHATTERJEE (oncoming nurse) by Delilah Grant RN (offgoing nurse).  Report included the following information SBAR, Kardex, Intake/Output, MAR, Recent Results, and Cardiac Rhythm normal sinus rhythm    Shift worked:  7a-7p     Shift summary and any significant changes:    Patient remains alert. Oriented to self.  Up to chair once today with assist x 2 with Tami Steady and gait belt.  Clear liquid diet but PO intake for lunch <25% and supper 0%.  PO fluid intake about 240 mL entire 12 hour shift.  Patient needs frequent encouragement for PO intake.  Repleted potassium with IV and PO dose this shift as ordered.  Horton remains patent.  IV fluids discontinued this shift and Lasix held due to poor oral intake per provider order.  Every 2 hour turns continued needs 2 people to turn patient due to patient resisting turns.  Off oxygen but oxygen saturations remained >91%.     Concerns for physician to address:  May need nutritional supplements added if PO intake remains low.  Admission database completed this shift.  Information obtained from .     Zone phone for oncoming shift:   [phone]       Activity:  Level of Assistance: Maximum assist, patient does 25-49%  Number times ambulated in hallways past shift: 0  Number of times OOB to chair past shift: 1    Cardiac:   Cardiac Monitoring: Yes      Cardiac Rhythm: Sinus rhythm    Access:  Current line(s): PIV     Genitourinary:   Urinary Status: Horton    Respiratory:   O2 Device: None (Room air)  Chronic home O2 use?: NO  Incentive spirometer at bedside: NO    GI:  Last BM (including prior to admit): 01/19/25  Current diet:  ADULT DIET; Clear Liquid  Passing flatus: YES    Pain Management:   Patient states pain is manageable on current regimen: YES    Skin:  Fede Scale Score: 13  Interventions: Wound Offloading (Prevention Methods): Repositioning, Pillows    Patient Safety:  Fall Risk: Nursing Judgement-Fall 
Extubated patient to 4lpm NC at 07:50. Patient tolerated well. SATs 97%, HR 85, RR 21.   
Nursing contacted Nocturnist/cross cover provider via non-urgent messaging system [email or web address] and notified patient k 3.0, states pt came in with cardiac arrest and anoxic brain injury, no acute concerns at present. No other concerns reported. No acute distress reported. No other information provided by nurse. VSS. Appears k 3.1 yesterday got 4 runs kcl 10meq iv.    Ordered kcl 10meq iv x4 runs today- defer dayshift team further mgmt, telemetry monitoring ordered x24 hours- defer dayshift team further continuation. Will defer further evaluation/management to the day shift primary attending care team. Patient denies any further complaints or concerns.     Nursing to notify Hospitalist for further/continued concerns. Will remain available overnight for further concerns if nursing/patient needs. Please note, there are RRT systems in this hospital in place that if nursing has acute or critical patient condition change or concern, this is to help facilitate and notify that patient needs immediate bedside evaluation by a provider.     Non-billable note.       
Speech Therapy Contact Note    Order received and chart reviewed. Note patient has plans for discharge to [facility] today. Attempted to see patient for initial SLP consult; however, she was working with another team member. Will follow-up as able. Recommend SLP at next level of care to address swallowing, communication, and cognition.     Niyah Hilton, CCC-SLP    
Spiritual Health History and Assessment/Progress Note  [facility]    Crisis, Code Blue,  ,      Name: Elsi Petersen MRN: [id number]    Age: 74 y.o.     Sex: female   Language: English   Confucianism: None   <principal problem not specified>     Date: 1/13/2025            Total Time Calculated: 32 min              Spiritual Assessment began in [facility] EMERGENCY DEPARTMENT        Referral/Consult From: Nurse   Encounter Overview/Reason: Crisis  Service Provided For: Family    Domonique, Belief, Meaning:   Patient unable to assess at this time  Family/Friends identify as spiritual, are connected with a domonique tradition or spiritual practice, and have beliefs or practices that help with coping during difficult times      Importance and Influence:  Patient unable to assess at this time  Family/Friends have no beliefs influential to healthcare decision-making identified during this visit    Community:  Patient Other: unable to assess at this time  Family/Friends are connected with a spiritual community: and feel well-supported. Support system includes: Spouse/Partner, Children, Domonique Community, and Extended family    Assessment and Plan of Care:     Patient Interventions include: Other: patient is currently unresponsive  Family/Friends Interventions include: Facilitated expression of thoughts and feelings, Explored spiritual coping/struggle/distress, and Affirmed coping skills/support systems    Patient Plan of Care: Spiritual Care available upon further referral  Family/Friends Plan of Care: Spiritual Care available upon further referral    Electronically signed by Chaplain TORSTEN CASTILLO on 1/13/2025 at 10:28 PM    
Spiritual Health History and Assessment/Progress Note  [facility]    Follow-up, Code STEMI,  ,      Name: Elsi Petersen MRN: [id number]    Age: 74 y.o.     Sex: female   Language: English   Rastafarian: None   <principal problem not specified>     Date: 1/13/2025            Total Time Calculated: 15 min              Spiritual Assessment continued in [facility] CARDIAC CATH/EP LAB        Referral/Consult From: Nurse   Encounter Overview/Reason: Follow-up  Service Provided For: Family    Domonique, Belief, Meaning:   Patient unable to assess at this time  Family/Friends identify as spiritual, are connected with a domonique tradition or spiritual practice, and have beliefs or practices that help with coping during difficult times      Importance and Influence:  Patient unable to assess at this time  Family/Friends have no beliefs influential to healthcare decision-making identified during this visit    Community:  Patient Other: unable to assess  Family/Friends feel well-supported. Support system includes: Spouse/Partner, Children, and Extended family    Assessment and Plan of Care:     Patient Interventions include: Other: patient is now in Cath Lab  Family/Friends Interventions include: Facilitated expression of thoughts and feelings, Explored spiritual coping/struggle/distress, Affirmed coping skills/support systems, and Other: Offered listening presence and assurance of prayer    Patient Plan of Care: Spiritual Care available upon further referral  Family/Friends Plan of Care: Spiritual Care available upon further referral    Electronically signed by Chaplain TORSTEN CASTILLO on 1/13/2025 at 11:42 PM    
Spiritual Health History and Assessment/Progress Note  [facility]    Follow-up, Code STEMI,  ,      Name: Elsi Petersen MRN: [id number]    Age: 74 y.o.     Sex: female   Language: English   Sabianism: None   Cardiac arrest     Date: 1/14/2025            Total Time Calculated: 27 min              Spiritual Assessment continued in [facility] CRIT[facility] CARE        Referral/Consult From: Rounding   Encounter Overview/Reason: Follow-up  Service Provided For: Family    Domonique, Belief, Meaning:   Patient unable to assess at this time  Family/Friends identify as spiritual, are connected with a domonique tradition or spiritual practice, and have beliefs or practices that help with coping during difficult times      Importance and Influence:  Patient unable to assess at this time  Family/Friends have no beliefs influential to healthcare decision-making identified during this visit    Community:  Patient Other: unable to assess at this time  Family/Friends feel well-supported. Support system includes: Spouse/Partner, Children, and Friends    Assessment and Plan of Care:     Patient Interventions include: Other: patient is currently intubated  Family/Friends Interventions include: Facilitated expression of thoughts and feelings, Explored spiritual coping/struggle/distress, Affirmed coping skills/support systems, and Facilitated life review and/or legacy; offered assurance of prayer    Patient Plan of Care: Spiritual Care available upon further referral  Family/Friends Plan of Care: Spiritual Care available upon further referral    Electronically signed by Chaplain TORSTEN CASTILLO on 1/14/2025 at 11:24 AM    
Spiritual Health History and Assessment/Progress Note  [facility]    Follow-up, Code STEMI, Adjustment to illness,      Name: Elsi Petersen MRN: [id number]    Age: 74 y.o.     Sex: female   Language: English   Cheondoism: None   Cardiac arrest     Date: 1/17/2025            Total Time Calculated: 23 min              Spiritual Assessment continued in [facility]        Referral/Consult From: Rounding   Encounter Overview/Reason: Follow-up  Service Provided For: Patient and family together    Domonique, Belief, Meaning:   Patient identifies as spiritual, is connected with a domonique tradition or spiritual practice, and has beliefs or practices that help with coping during difficult times  Family/Friends identify as spiritual, are connected with a domonique tradition or spiritual practice, and have beliefs or practices that help with coping during difficult times      Importance and Influence:  Patient has no beliefs influential to healthcare decision-making identified during this visit  Family/Friends have no beliefs influential to healthcare decision-making identified during this visit    Community:  Patient is connected with a spiritual community and feels well-supported. Support system includes: Spouse/Partner, Children, Domonique Community, and Friends  Family/Friends are connected with a spiritual community: and feel well-supported. Support system includes: Spouse/Partner, Children, Domonique Community, and Friends    Assessment and Plan of Care:     Patient Interventions include: Other: Mrs Petersen had been napping; woke at end of visit; offered her assurance of prayer  Family/Friends Interventions include: Facilitated expression of thoughts and feelings, Explored spiritual coping/struggle/distress, and Affirmed coping skills/support systems assurance of continued prayer    Patient Plan of Care: No spiritual needs identified for follow-up and Spiritual Care available upon further referral  Family/Friends 
TRANSFER - IN REPORT:    Verbal report received from SHENA Cooper on Esli Petersen  being received from CCU for routine progression of patient care      Report consisted of patient's Situation, Background, Assessment and   Recommendations(SBAR).     Information from the following report(s) Nurse Handoff Report and Cardiac Rhythm NSR  was reviewed with the receiving nurse.    Opportunity for questions and clarification was provided.      Assessment completed upon patient's arrival to unit and care assumed.      1843--Order given by Dr. Nieves to order a KUB of abdomen for abdominal distention.    End of Shift Note    Bedside shift change report given to SHENA Linder (oncoming nurse) by Ilene Cohen RN (offgoing nurse).  Report included the following information SBAR, Kardex, and Cardiac Rhythm NSR    Shift worked:  Day shift     Shift summary and any significant changes:     KUB order in and pending. MD made aware of elevated BP with systolic 160-170 at times. MD put in PRN Hydralazine order.   Patient only knows name and not birthday and follows some commands.    Concerns for physician to address:  --Abdominal distention  --Continuing to have decreased mentation   Zone phone for oncoming shift:   [phone]       Activity:  Level of Assistance: Maximum assist, patient does 25-49%  Number times ambulated in hallways past shift: 0  Number of times OOB to chair past shift: 0    Cardiac:   Cardiac Monitoring: Yes      Cardiac Rhythm: Sinus rhythm    Access:  Current line(s): PIV     Genitourinary:   Urinary Status: Horton, Patent, Draining    Respiratory:   O2 Device: Nasal cannula  Chronic home O2 use?: NO  Incentive spirometer at bedside: NO    GI:  Last BM (including prior to admit): 01/17/25  Current diet:  ADULT DIET; Easy to Chew  Passing flatus:     Pain Management:   Patient states pain is manageable on current regimen: no complaints of pain    Skin:  Fede Scale Score: 13  Interventions: Wound Offloading 
since prior progress note. Most recent are:  Patient Vitals for the past 24 hrs:   BP Temp Temp src Pulse Resp SpO2 Weight   01/18/25 1105 (!) 147/65 98 °F (36.7 °C) Axillary 83 20 95 % --   01/18/25 0916 (!) 151/67 -- -- -- -- -- --   01/18/25 0730 (!) 154/71 -- -- 80 20 95 % --   01/18/25 0710 (!) 179/87 97.7 °F (36.5 °C) Axillary 84 28 91 % --   01/18/25 0615 -- -- -- -- -- -- 88.5 kg (195 lb 1.7 oz)   01/18/25 0255 (!) 156/73 99.1 °F (37.3 °C) Axillary 85 20 95 % --   01/17/25 2250 127/84 -- -- 87 23 97 % --   01/17/25 2229 (!) 153/64 97.6 °F (36.4 °C) Axillary 91 20 94 % --   01/17/25 1945 (!) 153/59 97.6 °F (36.4 °C) Axillary 84 21 93 % --   01/17/25 1845 (!) 170/69 -- -- -- -- -- --   01/17/25 1750 (!) 164/73 -- -- 85 23 93 % --   01/17/25 1715 -- -- -- -- 25 -- --   01/17/25 1710 (!) 160/64 98.9 °F (37.2 °C) Oral 83 -- 91 % --   01/17/25 1500 (!) 145/60 -- -- 71 21 93 % --         Intake/Output Summary (Last 24 hours) at 1/18/2025 1404  Last data filed at 1/18/2025 1252  Gross per 24 hour   Intake 125.09 ml   Output 4375 ml   Net -4249.91 ml        I had a face to face encounter and independently examined this patient on 1/18/2025, as outlined below:  PHYSICAL EXAM:  General: Alert, cooperative  EENT:  EOMI. Anicteric sclerae.  Resp:  CTA bilaterally, no wheezing or rales.  No accessory muscle use  CV:  Regular  rhythm,  No edema  GI:  Soft, Non distended, Non tender.  +Bowel sounds  Neurologic:  Alert and awake, able to tell me her name and her son's name  Psych:   poor insight. Not anxious nor agitated  Skin:  No rashes.  No jaundice    Reviewed most current lab test results and cultures  YES  Reviewed most current radiology test results   YES  Review and summation of old records today    NO  Reviewed patient's current orders and MAR    YES  PMH/SH reviewed - no change compared to H&P    Procedures: see electronic medical records for all procedures/Xrays and details which were not copied into this note but 
°F (38.2 °C)   Resp 17   Ht 1.676 m (5' 6\")   Wt 93 kg (205 lb)   SpO2 97%   BMI 33.09 kg/m²     Physical Exam:      Neurological examination    Neck: no carotid bruits  Lungs: clear to auscultation  Heart:  no murmurs, regular rate  Lower extremity: no edema    Language: coma    Cranial nerves:   Perrrla  + corneal reflex  Facial sensation/motor: symmetric grimace to noxious stimulation  Corneal reflex intact  Oculocephalic reflex intact      Motor:   Tone normal  No evidence of fasciculations  No spontaneous limb movement      Sensory:  Upper extremity: minimal withdrawal to painful stimuli bilaterally  Lower extremity: brisk withdrawal to painful stimuli bilaterally      Reflexes:    Right Left  Biceps  2 2  Triceps 2 2  Brachiorad. 2 2  Patella  2 2  Achilles 1 1    Plantar response:  flexor bilaterally      Cerebellar testing:  no abnormal movements      Gait: unable to assess due to cognitive status   Labs:     Lab Results   Component Value Date/Time     01/15/2025 03:08 AM    K 4.2 01/15/2025 03:08 AM     01/15/2025 03:08 AM    BUN 21 01/15/2025 03:08 AM    WBC 14.0 01/15/2025 03:08 AM    HCT 30.0 01/15/2025 03:08 AM    HGB 9.9 01/15/2025 03:08 AM     01/15/2025 03:08 AM                  Principal Problem:    Cardiac arrest  Active Problems:    STEMI (ST elevation myocardial infarction) (HCC)    Unresponsive episode  Resolved Problems:    * No resolved hospital problems. *    I spent  40   minutes providing care to this  acutely ill inpatient with > 50% of the time counseling and assisting in the coordination of care of the patient on the patient's hospital floor/unit.                 Signed By:  Gabriele Freeman DO FAAN    January 15, 2025

## 2025-01-21 NOTE — CARDIO/PULMONARY
Chart reviewed: Patient is 74 y.o. female admitted with Cardiac arrest [I46.9]  STEMI (ST elevation myocardial infarction) (HCC) [I21.3]  ST elevation myocardial infarction (STEMI), unspecified artery (HCC) [I21.3]    Cardiac cath 1/14/25 with PCI.    Discharge Diagnosis:   Cardiopulmonary arrest  Inferior wall MI s/p PCI  Cardiogenic shock  Anoxic brain injury  Acute hypoxic respiratory failure  FIDENCIO  Pneumonia  Pulmonary edema  FIDENCIO  Ileus  Acute hypokalemia    Patient d/c to IPR. Not appropriate for OP CP Rehab a this time.

## 2025-02-22 ENCOUNTER — APPOINTMENT (OUTPATIENT)
Facility: HOSPITAL | Age: 75
DRG: 683 | End: 2025-02-22
Payer: MEDICARE

## 2025-02-22 ENCOUNTER — HOSPITAL ENCOUNTER (INPATIENT)
Facility: HOSPITAL | Age: 75
LOS: 2 days | Discharge: HOME HEALTH | DRG: 683 | End: 2025-02-25
Attending: STUDENT IN AN ORGANIZED HEALTH CARE EDUCATION/TRAINING PROGRAM | Admitting: STUDENT IN AN ORGANIZED HEALTH CARE EDUCATION/TRAINING PROGRAM
Payer: MEDICARE

## 2025-02-22 DIAGNOSIS — E83.51 HYPOCALCEMIA: Primary | ICD-10-CM

## 2025-02-22 LAB
ALBUMIN SERPL-MCNC: 2.9 G/DL (ref 3.5–5)
ALBUMIN/GLOB SERPL: 0.7 (ref 1.1–2.2)
ALP SERPL-CCNC: 119 U/L (ref 45–117)
ALT SERPL-CCNC: 11 U/L (ref 12–78)
ANION GAP SERPL CALC-SCNC: 12 MMOL/L (ref 2–12)
AST SERPL-CCNC: 17 U/L (ref 15–37)
BASOPHILS # BLD: 0.04 K/UL (ref 0–0.1)
BASOPHILS NFR BLD: 0.4 % (ref 0–1)
BILIRUB SERPL-MCNC: 0.7 MG/DL (ref 0.2–1)
BUN SERPL-MCNC: 13 MG/DL (ref 6–20)
BUN/CREAT SERPL: 9 (ref 12–20)
CALCIUM SERPL-MCNC: 5.5 MG/DL (ref 8.5–10.1)
CHLORIDE SERPL-SCNC: 103 MMOL/L (ref 97–108)
CO2 SERPL-SCNC: 23 MMOL/L (ref 21–32)
COMMENT:: NORMAL
CREAT SERPL-MCNC: 1.42 MG/DL (ref 0.55–1.02)
DIFFERENTIAL METHOD BLD: ABNORMAL
EOSINOPHIL # BLD: 0.16 K/UL (ref 0–0.4)
EOSINOPHIL NFR BLD: 1.5 % (ref 0–7)
ERYTHROCYTE [DISTWIDTH] IN BLOOD BY AUTOMATED COUNT: 14.1 % (ref 11.5–14.5)
GLOBULIN SER CALC-MCNC: 4.3 G/DL (ref 2–4)
GLUCOSE SERPL-MCNC: 114 MG/DL (ref 65–100)
HCT VFR BLD AUTO: 33.1 % (ref 35–47)
HGB BLD-MCNC: 11 G/DL (ref 11.5–16)
IMM GRANULOCYTES # BLD AUTO: 0.06 K/UL (ref 0–0.04)
IMM GRANULOCYTES NFR BLD AUTO: 0.6 % (ref 0–0.5)
LIPASE SERPL-CCNC: 29 U/L (ref 13–75)
LYMPHOCYTES # BLD: 1.86 K/UL (ref 0.8–3.5)
LYMPHOCYTES NFR BLD: 17.5 % (ref 12–49)
MCH RBC QN AUTO: 32 PG (ref 26–34)
MCHC RBC AUTO-ENTMCNC: 33.2 G/DL (ref 30–36.5)
MCV RBC AUTO: 96.2 FL (ref 80–99)
MONOCYTES # BLD: 0.67 K/UL (ref 0–1)
MONOCYTES NFR BLD: 6.3 % (ref 5–13)
NEUTS SEG # BLD: 7.85 K/UL (ref 1.8–8)
NEUTS SEG NFR BLD: 73.7 % (ref 32–75)
NRBC # BLD: 0 K/UL (ref 0–0.01)
NRBC BLD-RTO: 0 PER 100 WBC
PLATELET # BLD AUTO: 233 K/UL (ref 150–400)
PMV BLD AUTO: 10.6 FL (ref 8.9–12.9)
POTASSIUM SERPL-SCNC: 3 MMOL/L (ref 3.5–5.1)
PROT SERPL-MCNC: 7.2 G/DL (ref 6.4–8.2)
RBC # BLD AUTO: 3.44 M/UL (ref 3.8–5.2)
SODIUM SERPL-SCNC: 138 MMOL/L (ref 136–145)
SPECIMEN HOLD: NORMAL
TROPONIN I SERPL HS-MCNC: 40 NG/L (ref 0–51)
WBC # BLD AUTO: 10.6 K/UL (ref 3.6–11)

## 2025-02-22 PROCEDURE — 93005 ELECTROCARDIOGRAM TRACING: CPT | Performed by: STUDENT IN AN ORGANIZED HEALTH CARE EDUCATION/TRAINING PROGRAM

## 2025-02-22 PROCEDURE — 6360000002 HC RX W HCPCS: Performed by: STUDENT IN AN ORGANIZED HEALTH CARE EDUCATION/TRAINING PROGRAM

## 2025-02-22 PROCEDURE — 99285 EMERGENCY DEPT VISIT HI MDM: CPT

## 2025-02-22 PROCEDURE — 80053 COMPREHEN METABOLIC PANEL: CPT

## 2025-02-22 PROCEDURE — 85025 COMPLETE CBC W/AUTO DIFF WBC: CPT

## 2025-02-22 PROCEDURE — 96375 TX/PRO/DX INJ NEW DRUG ADDON: CPT

## 2025-02-22 PROCEDURE — 83690 ASSAY OF LIPASE: CPT

## 2025-02-22 PROCEDURE — 84484 ASSAY OF TROPONIN QUANT: CPT

## 2025-02-22 PROCEDURE — 70450 CT HEAD/BRAIN W/O DYE: CPT

## 2025-02-22 PROCEDURE — 2580000003 HC RX 258: Performed by: STUDENT IN AN ORGANIZED HEALTH CARE EDUCATION/TRAINING PROGRAM

## 2025-02-22 PROCEDURE — 36415 COLL VENOUS BLD VENIPUNCTURE: CPT

## 2025-02-22 PROCEDURE — 96361 HYDRATE IV INFUSION ADD-ON: CPT

## 2025-02-22 PROCEDURE — 74176 CT ABD & PELVIS W/O CONTRAST: CPT

## 2025-02-22 RX ORDER — 0.9 % SODIUM CHLORIDE 0.9 %
500 INTRAVENOUS SOLUTION INTRAVENOUS ONCE
Status: COMPLETED | OUTPATIENT
Start: 2025-02-22 | End: 2025-02-23

## 2025-02-22 RX ORDER — CALCIUM GLUCONATE 20 MG/ML
2000 INJECTION, SOLUTION INTRAVENOUS
Status: COMPLETED | OUTPATIENT
Start: 2025-02-22 | End: 2025-02-23

## 2025-02-22 RX ORDER — ONDANSETRON 2 MG/ML
4 INJECTION INTRAMUSCULAR; INTRAVENOUS ONCE
Status: COMPLETED | OUTPATIENT
Start: 2025-02-22 | End: 2025-02-22

## 2025-02-22 RX ADMIN — ONDANSETRON 4 MG: 2 INJECTION, SOLUTION INTRAMUSCULAR; INTRAVENOUS at 21:00

## 2025-02-22 RX ADMIN — SODIUM CHLORIDE 500 ML: 9 INJECTION, SOLUTION INTRAVENOUS at 21:02

## 2025-02-23 PROBLEM — E83.51 HYPOCALCEMIA: Status: ACTIVE | Noted: 2025-02-23

## 2025-02-23 LAB
ALBUMIN SERPL-MCNC: 2.8 G/DL (ref 3.5–5)
ALBUMIN/GLOB SERPL: 0.7 (ref 1.1–2.2)
ALP SERPL-CCNC: 115 U/L (ref 45–117)
ALT SERPL-CCNC: 11 U/L (ref 12–78)
ANION GAP SERPL CALC-SCNC: 10 MMOL/L (ref 2–12)
ANION GAP SERPL CALC-SCNC: 7 MMOL/L (ref 2–12)
ANION GAP SERPL CALC-SCNC: 9 MMOL/L (ref 2–12)
APPEARANCE UR: ABNORMAL
AST SERPL-CCNC: 18 U/L (ref 15–37)
BACTERIA URNS QL MICRO: ABNORMAL /HPF
BASOPHILS # BLD: 0.02 K/UL (ref 0–0.1)
BASOPHILS NFR BLD: 0.2 % (ref 0–1)
BILIRUB SERPL-MCNC: 0.6 MG/DL (ref 0.2–1)
BILIRUB UR QL: NEGATIVE
BUN SERPL-MCNC: 13 MG/DL (ref 6–20)
BUN SERPL-MCNC: 14 MG/DL (ref 6–20)
BUN SERPL-MCNC: 16 MG/DL (ref 6–20)
BUN/CREAT SERPL: 10 (ref 12–20)
BUN/CREAT SERPL: 10 (ref 12–20)
BUN/CREAT SERPL: 11 (ref 12–20)
CA-I BLD-SCNC: 0.82 MMOL/L (ref 1.13–1.32)
CALCIUM SERPL-MCNC: 6.1 MG/DL (ref 8.5–10.1)
CALCIUM SERPL-MCNC: 6.7 MG/DL (ref 8.5–10.1)
CALCIUM SERPL-MCNC: 7 MG/DL (ref 8.5–10.1)
CHLORIDE SERPL-SCNC: 102 MMOL/L (ref 97–108)
CO2 SERPL-SCNC: 26 MMOL/L (ref 21–32)
CO2 SERPL-SCNC: 26 MMOL/L (ref 21–32)
CO2 SERPL-SCNC: 27 MMOL/L (ref 21–32)
COLOR UR: ABNORMAL
CREAT SERPL-MCNC: 1.35 MG/DL (ref 0.55–1.02)
CREAT SERPL-MCNC: 1.36 MG/DL (ref 0.55–1.02)
CREAT SERPL-MCNC: 1.41 MG/DL (ref 0.55–1.02)
DIFFERENTIAL METHOD BLD: ABNORMAL
EKG ATRIAL RATE: 74 BPM
EKG DIAGNOSIS: NORMAL
EKG P AXIS: 34 DEGREES
EKG P-R INTERVAL: 202 MS
EKG Q-T INTERVAL: 476 MS
EKG QRS DURATION: 82 MS
EKG QTC CALCULATION (BAZETT): 528 MS
EKG R AXIS: 9 DEGREES
EKG T AXIS: -89 DEGREES
EKG VENTRICULAR RATE: 74 BPM
EOSINOPHIL # BLD: 0.09 K/UL (ref 0–0.4)
EOSINOPHIL NFR BLD: 1.1 % (ref 0–7)
EPITH CASTS URNS QL MICRO: ABNORMAL /LPF
ERYTHROCYTE [DISTWIDTH] IN BLOOD BY AUTOMATED COUNT: 13.8 % (ref 11.5–14.5)
GLOBULIN SER CALC-MCNC: 4.2 G/DL (ref 2–4)
GLUCOSE BLD STRIP.AUTO-MCNC: 132 MG/DL (ref 65–117)
GLUCOSE BLD STRIP.AUTO-MCNC: 169 MG/DL (ref 65–117)
GLUCOSE BLD STRIP.AUTO-MCNC: 91 MG/DL (ref 65–117)
GLUCOSE SERPL-MCNC: 140 MG/DL (ref 65–100)
GLUCOSE SERPL-MCNC: 87 MG/DL (ref 65–100)
GLUCOSE SERPL-MCNC: 94 MG/DL (ref 65–100)
GLUCOSE UR STRIP.AUTO-MCNC: NEGATIVE MG/DL
HCT VFR BLD AUTO: 30.3 % (ref 35–47)
HGB BLD-MCNC: 9.6 G/DL (ref 11.5–16)
HGB UR QL STRIP: ABNORMAL
IMM GRANULOCYTES # BLD AUTO: 0.04 K/UL (ref 0–0.04)
IMM GRANULOCYTES NFR BLD AUTO: 0.5 % (ref 0–0.5)
KETONES UR QL STRIP.AUTO: ABNORMAL MG/DL
LEUKOCYTE ESTERASE UR QL STRIP.AUTO: ABNORMAL
LYMPHOCYTES # BLD: 1.6 K/UL (ref 0.8–3.5)
LYMPHOCYTES NFR BLD: 19.1 % (ref 12–49)
MAGNESIUM SERPL-MCNC: 0.8 MG/DL (ref 1.6–2.4)
MAGNESIUM SERPL-MCNC: 2.4 MG/DL (ref 1.6–2.4)
MAGNESIUM SERPL-MCNC: <0.3 MG/DL (ref 1.6–2.4)
MCH RBC QN AUTO: 31.3 PG (ref 26–34)
MCHC RBC AUTO-ENTMCNC: 31.7 G/DL (ref 30–36.5)
MCV RBC AUTO: 98.7 FL (ref 80–99)
MONOCYTES # BLD: 0.63 K/UL (ref 0–1)
MONOCYTES NFR BLD: 7.5 % (ref 5–13)
NEUTS SEG # BLD: 6 K/UL (ref 1.8–8)
NEUTS SEG NFR BLD: 71.6 % (ref 32–75)
NITRITE UR QL STRIP.AUTO: NEGATIVE
NRBC # BLD: 0 K/UL (ref 0–0.01)
NRBC BLD-RTO: 0 PER 100 WBC
PH UR STRIP: 5.5 (ref 5–8)
PHOSPHATE SERPL-MCNC: 3.2 MG/DL (ref 2.6–4.7)
PLATELET # BLD AUTO: 196 K/UL (ref 150–400)
PMV BLD AUTO: 11.1 FL (ref 8.9–12.9)
POTASSIUM SERPL-SCNC: 3 MMOL/L (ref 3.5–5.1)
POTASSIUM SERPL-SCNC: 3.3 MMOL/L (ref 3.5–5.1)
POTASSIUM SERPL-SCNC: 3.5 MMOL/L (ref 3.5–5.1)
PROT SERPL-MCNC: 7 G/DL (ref 6.4–8.2)
PROT UR STRIP-MCNC: 30 MG/DL
RBC # BLD AUTO: 3.07 M/UL (ref 3.8–5.2)
RBC #/AREA URNS HPF: ABNORMAL /HPF (ref 0–5)
SERVICE CMNT-IMP: ABNORMAL
SERVICE CMNT-IMP: ABNORMAL
SERVICE CMNT-IMP: NORMAL
SODIUM SERPL-SCNC: 136 MMOL/L (ref 136–145)
SODIUM SERPL-SCNC: 137 MMOL/L (ref 136–145)
SODIUM SERPL-SCNC: 138 MMOL/L (ref 136–145)
SP GR UR REFRACTOMETRY: 1.02
TROPONIN I SERPL HS-MCNC: 43 NG/L (ref 0–51)
URINE CULTURE IF INDICATED: ABNORMAL
UROBILINOGEN UR QL STRIP.AUTO: 1 EU/DL (ref 0.2–1)
WBC # BLD AUTO: 8.4 K/UL (ref 3.6–11)
WBC URNS QL MICRO: ABNORMAL /HPF (ref 0–4)

## 2025-02-23 PROCEDURE — 85025 COMPLETE CBC W/AUTO DIFF WBC: CPT

## 2025-02-23 PROCEDURE — 96366 THER/PROPH/DIAG IV INF ADDON: CPT

## 2025-02-23 PROCEDURE — 6370000000 HC RX 637 (ALT 250 FOR IP): Performed by: STUDENT IN AN ORGANIZED HEALTH CARE EDUCATION/TRAINING PROGRAM

## 2025-02-23 PROCEDURE — 80048 BASIC METABOLIC PNL TOTAL CA: CPT

## 2025-02-23 PROCEDURE — 36415 COLL VENOUS BLD VENIPUNCTURE: CPT

## 2025-02-23 PROCEDURE — 80053 COMPREHEN METABOLIC PANEL: CPT

## 2025-02-23 PROCEDURE — 81001 URINALYSIS AUTO W/SCOPE: CPT

## 2025-02-23 PROCEDURE — 82962 GLUCOSE BLOOD TEST: CPT

## 2025-02-23 PROCEDURE — 6360000002 HC RX W HCPCS: Performed by: STUDENT IN AN ORGANIZED HEALTH CARE EDUCATION/TRAINING PROGRAM

## 2025-02-23 PROCEDURE — 82330 ASSAY OF CALCIUM: CPT

## 2025-02-23 PROCEDURE — 96365 THER/PROPH/DIAG IV INF INIT: CPT

## 2025-02-23 PROCEDURE — 83735 ASSAY OF MAGNESIUM: CPT

## 2025-02-23 PROCEDURE — 87086 URINE CULTURE/COLONY COUNT: CPT

## 2025-02-23 PROCEDURE — 82652 VIT D 1 25-DIHYDROXY: CPT

## 2025-02-23 PROCEDURE — 84484 ASSAY OF TROPONIN QUANT: CPT

## 2025-02-23 PROCEDURE — 2500000003 HC RX 250 WO HCPCS: Performed by: STUDENT IN AN ORGANIZED HEALTH CARE EDUCATION/TRAINING PROGRAM

## 2025-02-23 PROCEDURE — 84100 ASSAY OF PHOSPHORUS: CPT

## 2025-02-23 PROCEDURE — 84443 ASSAY THYROID STIM HORMONE: CPT

## 2025-02-23 PROCEDURE — 2060000000 HC ICU INTERMEDIATE R&B

## 2025-02-23 RX ORDER — CALCIUM GLUCONATE 20 MG/ML
1000 INJECTION, SOLUTION INTRAVENOUS ONCE
Status: COMPLETED | OUTPATIENT
Start: 2025-02-23 | End: 2025-02-23

## 2025-02-23 RX ORDER — POTASSIUM CHLORIDE 1500 MG/1
40 TABLET, EXTENDED RELEASE ORAL ONCE
Status: COMPLETED | OUTPATIENT
Start: 2025-02-23 | End: 2025-02-23

## 2025-02-23 RX ORDER — ACETAMINOPHEN 650 MG/1
650 SUPPOSITORY RECTAL EVERY 6 HOURS PRN
Status: DISCONTINUED | OUTPATIENT
Start: 2025-02-23 | End: 2025-02-25 | Stop reason: HOSPADM

## 2025-02-23 RX ORDER — SODIUM CHLORIDE 0.9 % (FLUSH) 0.9 %
5-40 SYRINGE (ML) INJECTION PRN
Status: DISCONTINUED | OUTPATIENT
Start: 2025-02-23 | End: 2025-02-25 | Stop reason: HOSPADM

## 2025-02-23 RX ORDER — LANOLIN ALCOHOL/MO/W.PET/CERES
400 CREAM (GRAM) TOPICAL ONCE
Status: COMPLETED | OUTPATIENT
Start: 2025-02-23 | End: 2025-02-23

## 2025-02-23 RX ORDER — INSULIN LISPRO 100 [IU]/ML
0-4 INJECTION, SOLUTION INTRAVENOUS; SUBCUTANEOUS
Status: DISCONTINUED | OUTPATIENT
Start: 2025-02-23 | End: 2025-02-25 | Stop reason: HOSPADM

## 2025-02-23 RX ORDER — ENOXAPARIN SODIUM 100 MG/ML
40 INJECTION SUBCUTANEOUS DAILY
Status: DISCONTINUED | OUTPATIENT
Start: 2025-02-23 | End: 2025-02-25 | Stop reason: HOSPADM

## 2025-02-23 RX ORDER — ROSUVASTATIN CALCIUM 10 MG/1
10 TABLET, COATED ORAL
Status: DISCONTINUED | OUTPATIENT
Start: 2025-02-23 | End: 2025-02-25 | Stop reason: HOSPADM

## 2025-02-23 RX ORDER — VITAMIN B COMPLEX
1000 TABLET ORAL DAILY
Status: DISCONTINUED | OUTPATIENT
Start: 2025-02-23 | End: 2025-02-25 | Stop reason: HOSPADM

## 2025-02-23 RX ORDER — ACETAMINOPHEN 325 MG/1
650 TABLET ORAL EVERY 6 HOURS PRN
Status: DISCONTINUED | OUTPATIENT
Start: 2025-02-23 | End: 2025-02-25 | Stop reason: HOSPADM

## 2025-02-23 RX ORDER — AMLODIPINE BESYLATE 5 MG/1
5 TABLET ORAL DAILY
Status: DISCONTINUED | OUTPATIENT
Start: 2025-02-23 | End: 2025-02-25 | Stop reason: HOSPADM

## 2025-02-23 RX ORDER — POTASSIUM CHLORIDE 7.45 MG/ML
10 INJECTION INTRAVENOUS
Status: COMPLETED | OUTPATIENT
Start: 2025-02-23 | End: 2025-02-23

## 2025-02-23 RX ORDER — ONDANSETRON 2 MG/ML
4 INJECTION INTRAMUSCULAR; INTRAVENOUS EVERY 6 HOURS PRN
Status: DISCONTINUED | OUTPATIENT
Start: 2025-02-23 | End: 2025-02-25 | Stop reason: HOSPADM

## 2025-02-23 RX ORDER — GLUCAGON 1 MG/ML
1 KIT INJECTION PRN
Status: DISCONTINUED | OUTPATIENT
Start: 2025-02-23 | End: 2025-02-25 | Stop reason: HOSPADM

## 2025-02-23 RX ORDER — MAGNESIUM SULFATE HEPTAHYDRATE 40 MG/ML
4000 INJECTION, SOLUTION INTRAVENOUS ONCE
Status: COMPLETED | OUTPATIENT
Start: 2025-02-23 | End: 2025-02-23

## 2025-02-23 RX ORDER — MAGNESIUM SULFATE IN WATER 40 MG/ML
2000 INJECTION, SOLUTION INTRAVENOUS ONCE
Status: COMPLETED | OUTPATIENT
Start: 2025-02-23 | End: 2025-02-23

## 2025-02-23 RX ORDER — ASPIRIN 81 MG/1
81 TABLET, CHEWABLE ORAL DAILY
Status: DISCONTINUED | OUTPATIENT
Start: 2025-02-23 | End: 2025-02-25 | Stop reason: HOSPADM

## 2025-02-23 RX ORDER — SODIUM CHLORIDE 9 MG/ML
INJECTION, SOLUTION INTRAVENOUS PRN
Status: DISCONTINUED | OUTPATIENT
Start: 2025-02-23 | End: 2025-02-25 | Stop reason: HOSPADM

## 2025-02-23 RX ORDER — DEXTROSE MONOHYDRATE 100 MG/ML
INJECTION, SOLUTION INTRAVENOUS CONTINUOUS PRN
Status: DISCONTINUED | OUTPATIENT
Start: 2025-02-23 | End: 2025-02-25 | Stop reason: HOSPADM

## 2025-02-23 RX ORDER — VALSARTAN 160 MG/1
160 TABLET ORAL DAILY
Status: DISCONTINUED | OUTPATIENT
Start: 2025-02-23 | End: 2025-02-25 | Stop reason: HOSPADM

## 2025-02-23 RX ORDER — PANTOPRAZOLE SODIUM 40 MG/1
40 TABLET, DELAYED RELEASE ORAL
Status: DISCONTINUED | OUTPATIENT
Start: 2025-02-23 | End: 2025-02-25 | Stop reason: HOSPADM

## 2025-02-23 RX ORDER — POTASSIUM CHLORIDE 7.45 MG/ML
10 INJECTION INTRAVENOUS
Status: DISCONTINUED | OUTPATIENT
Start: 2025-02-23 | End: 2025-02-23

## 2025-02-23 RX ORDER — SODIUM CHLORIDE 0.9 % (FLUSH) 0.9 %
5-40 SYRINGE (ML) INJECTION EVERY 12 HOURS SCHEDULED
Status: DISCONTINUED | OUTPATIENT
Start: 2025-02-23 | End: 2025-02-25 | Stop reason: HOSPADM

## 2025-02-23 RX ADMIN — POTASSIUM BICARBONATE 20 MEQ: 782 TABLET, EFFERVESCENT ORAL at 20:54

## 2025-02-23 RX ADMIN — ASPIRIN 81 MG: 81 TABLET, CHEWABLE ORAL at 08:50

## 2025-02-23 RX ADMIN — Medication 400 MG: at 08:48

## 2025-02-23 RX ADMIN — TICAGRELOR 90 MG: 90 TABLET ORAL at 20:54

## 2025-02-23 RX ADMIN — POTASSIUM BICARBONATE 40 MEQ: 782 TABLET, EFFERVESCENT ORAL at 00:19

## 2025-02-23 RX ADMIN — MAGNESIUM SULFATE HEPTAHYDRATE 2000 MG: 40 INJECTION, SOLUTION INTRAVENOUS at 07:21

## 2025-02-23 RX ADMIN — TICAGRELOR 90 MG: 90 TABLET ORAL at 09:02

## 2025-02-23 RX ADMIN — POTASSIUM CHLORIDE 10 MEQ: 7.46 INJECTION, SOLUTION INTRAVENOUS at 08:51

## 2025-02-23 RX ADMIN — TICAGRELOR 90 MG: 90 TABLET ORAL at 04:23

## 2025-02-23 RX ADMIN — ENOXAPARIN SODIUM 40 MG: 100 INJECTION SUBCUTANEOUS at 08:49

## 2025-02-23 RX ADMIN — Medication 1000 UNITS: at 08:49

## 2025-02-23 RX ADMIN — POTASSIUM CHLORIDE 40 MEQ: 1500 TABLET, EXTENDED RELEASE ORAL at 07:02

## 2025-02-23 RX ADMIN — CALCIUM GLUCONATE 2000 MG: 20 INJECTION, SOLUTION INTRAVENOUS at 00:33

## 2025-02-23 RX ADMIN — POTASSIUM CHLORIDE 10 MEQ: 7.46 INJECTION, SOLUTION INTRAVENOUS at 06:55

## 2025-02-23 RX ADMIN — CALCIUM GLUCONATE 1000 MG: 20 INJECTION, SOLUTION INTRAVENOUS at 06:08

## 2025-02-23 RX ADMIN — MAGNESIUM SULFATE HEPTAHYDRATE 4000 MG: 40 INJECTION, SOLUTION INTRAVENOUS at 14:36

## 2025-02-23 RX ADMIN — ROSUVASTATIN CALCIUM 10 MG: 10 TABLET, FILM COATED ORAL at 20:54

## 2025-02-23 RX ADMIN — ROSUVASTATIN CALCIUM 10 MG: 10 TABLET, FILM COATED ORAL at 04:25

## 2025-02-23 RX ADMIN — CALCIUM GLUCONATE 1000 MG: 20 INJECTION, SOLUTION INTRAVENOUS at 21:12

## 2025-02-23 RX ADMIN — AMLODIPINE BESYLATE 5 MG: 5 TABLET ORAL at 08:49

## 2025-02-23 RX ADMIN — SODIUM CHLORIDE, PRESERVATIVE FREE 10 ML: 5 INJECTION INTRAVENOUS at 08:49

## 2025-02-23 RX ADMIN — CALCIUM GLUCONATE 1000 MG: 20 INJECTION, SOLUTION INTRAVENOUS at 14:34

## 2025-02-23 NOTE — PROGRESS NOTES
End of Shift Note    Bedside shift change report given to Michelle CHATTERJEE (oncoming nurse) by ASHA COYLE RN (offgoing nurse).  Report included the following information SBAR and Kardex    Shift worked:  7a-7p     Shift summary and any significant changes:     Mag, K, Calcium placement today. Current Calcium still 7.0, MD aware    1 BM today     Concerns for physician to address:       Zone phone for oncoming shift:          Activity:  Level of Assistance: Minimal assist, patient does 75% or more  Number times ambulated in hallways past shift: 0  Number of times OOB to chair past shift: 1    Cardiac:   Cardiac Monitoring: Yes      Cardiac Rhythm: Sinus rhythm    Access:  Current line(s): PIV     Genitourinary:        Respiratory:   O2 Device: None (Room air)  Chronic home O2 use?: No  Incentive spirometer at bedside: NO    GI:  Last BM (including prior to admit): 02/23/25  Current diet:  ADULT DIET; Regular  Passing flatus: YES    Pain Management:   Patient states pain is manageable on current regimen: YES    Skin:  Fede Scale Score: 18  Interventions: Wound Offloading (Prevention Methods): Pillows, Repositioning, Turning    Patient Safety:  Fall Risk:    Fall Risk Interventions  Toilet Every 2 Hours-In Advance of Need: Yes  Hourly Visual Checks: In bed, Awake  Fall Visual Posted: Armband, Socks  Room Door Open: Yes  Alarm On: Bed, Chair  Patient Moved Closer to Nursing Station: No    Active Consults:   None    Length of Stay:  Expected LOS: 3  Actual LOS: 0    ASHA COYLE RN

## 2025-02-23 NOTE — ED NOTES
ED TO INPATIENT SBAR HANDOFF        Verbal report given to SHENA Apodaca on Elsi Petersen  being transferred to 2301 for routine progression of patient care       Patient Name: Elsi Petersen   Preferred Name: Elsi BECKERB: 1950  74 y.o.   Family/Caregiver Present: no   Code Status Order: Full Code  PO Status: NPO:No  Telemetry Order:   C-SSRS: Risk of Suicide: No Risk  Sitter no  n/a  Restraints:       Information from the following report(s) Nurse Handoff Report, ED Encounter Summary, ED SBAR, Adult Overview, MAR, and Neuro Assessment was reviewed with the receiving nurse.    [place] Fall Assessment:    Presents to emergency department  because of falls (Syncope, seizure, or loss of consciousness): No  Age > 70: Yes  Altered Mental Status, Intoxication with alcohol or substance confusion (Disorientation, impaired judgment, poor safety awaremess, or inability to follow instructions): No  Impaired Mobility: Ambulates or transfers with assistive devices or assistance; Unable to ambulate or transer.: No  Nursing Judgement: No          Situation  Chief Complaint   Patient presents with    Nausea     Pt arrives w cc of N/V/D and weakness. Denies pain, pt also c/o weakness. Symptoms started this morning. Pt got 8mg zofran ODT by EMS, pt reports nausea has improved. Emesis is greenish/brown in color      Brief Description of Patient's Condition: n/a  Mental Status: oriented, alert, coherent, logical, thought processes intact, and able to concentrate and follow conversation  Arrived from:Home  Imaging:   CT Head W/O Contrast   Final Result   No acute intracranial process.   There is no intracranial mass or hemorrhage.            CLINICAL HISTORY: Abdominal pain, nausea vomiting and diarrhea   INDICATION: Abdominal pain, nausea and vomiting.      COMPARISON:   CONTRAST:  None.      TECHNIQUE:   Thin axial images were obtained through the abdomen and pelvis. Coronal and   sagittal reformats were generated. Oral contrast was not

## 2025-02-23 NOTE — PLAN OF CARE
Saw at bedside, aggressively repleting electrolytes, rechecking labs. Agree with plan per Dr. Hightower.    David L Mendel, MD

## 2025-02-23 NOTE — ED PROVIDER NOTES
[facility] EMERGENCY DEPARTMENT  EMERGENCY DEPARTMENT ENCOUNTER       Pt Name: Elsi Petersen  MRN: [id number]  Birthdate 1950  Date of evaluation: 2/22/2025  Provider: Robert Quinteros DO   PCP: Pily Nguyễn MD  Note Started: 8:38 PM 2/22/25     CHIEF COMPLAINT       Chief Complaint   Patient presents with    Nausea     Pt arrives w cc of N/V/D and weakness. Denies pain, pt also c/o weakness. Symptoms started this morning. Pt got 8mg zofran ODT by EMS, pt reports nausea has improved. Emesis is greenish/brown in color         HISTORY OF PRESENT ILLNESS: 1 or more elements      History From: Patient and Patient's   None     Elsi Petersen is a 74 y.o. female who presents with cc of nausea, vomiting starting this morning. Reports decreased appetite, decreased solid food intake. Family members were recently staying with her.  states they had a couple visitors and \"she interacted with them well\".  states 2hrs ago, she was talking with a friend and reports a few seconds word finding difficulty and face numbness.  reports she has had issues with face and feet numbness in the past. The numbness lasted for about 30 minutes, all over her face..  states that her smile was normal during this episode and she had equal strength. She had diarrhea. Called her PCP who called back and recommended her go to the ER. Patient denies any numbness, weakness, word finding difficulty currently. Denies any abdominal pain or chest pain. Denies any dyspnea. Recent admission for cardiac arrest sp STEMI. Compliant with her meds.     Nursing Notes were all reviewed and agreed with or any disagreements were addressed in the HPI.       PAST HISTORY     Past Medical History:  History reviewed. No pertinent past medical history.      Past Surgical History:  Past Surgical History:   Procedure Laterality Date    CARDIAC PROCEDURE N/A 1/13/2025    Left heart cath / coronary angiography performed by

## 2025-02-23 NOTE — H&P
Hospitalist Admission Note    NAME:Elsi Petersen   : 1950   MRN: [id number]     Date/Time: 2025 2:28 AM    Patient PCP: Pily Nguyễn MD    *Please be aware this note is formulated with assistance from Dragon voice-recognition dictation software. Please excuse any errors that may be present*    ______________________________________________________________________  Given the patient's current clinical presentation, I have a high level of concern for decompensation if discharged from the emergency department.  Complex decision making was performed, which includes reviewing the patient's available past medical records, laboratory results, and x-ray films.       My assessment of this patient's clinical condition and my plan of care is as follows.    Problem List:  Patient Active Problem List   Diagnosis    Severe obesity    Cardiac arrest (HCC)    STEMI (ST elevation myocardial infarction) ([facility])    Unresponsive episode    Metabolic encephalopathy    Hypocalcemia         Assessment / Plan:    Symptomatic hypocalcemia  Hypokalemia   K 3.0, Ca corrected to 6.4 today and presented with weakness, lethargy, numbness and tingling   - S/ IV calcium and potassium replacement in the ED   - Repeat calcium and potassium now   - Hold PPI for now   - Hold lasix given hypovolemia   - EKG with prolonged QTC from prior, will repeat once electrolytes are normalized   - Send ionized calcium, check TSH, phosphorous, magnesium, PTH, vitamin D levels   - Monitor on telemetry  - Admit to stepdown given electrolyte abnormalities and  QTC prolongation     FIDENCIO  - Cr 1.42, likely secondary to nausea, vomiting, diarrhea  - S/p IVF given in the ED  - Hold metformin   - Hold lasix   - Hold valsartan for now   - Repeat BMP with hydration     Recent admission for cardiac arrest   Inferior wall MI s/p PCI 2025   - Continue home aspirin, brilinta     Anoxic brain injury  -  very involved   - Monitor     T2DM  - Hold home  not in acute distress.     Appearance: Normal appearance.   HENT:      Mouth/Throat:      Mouth: Mucous membranes are dry.   Cardiovascular:      Rate and Rhythm: Normal rate and regular rhythm.      Pulses: Normal pulses.      Heart sounds: Normal heart sounds. No murmur heard.     No friction rub. No gallop.   Pulmonary:      Effort: Pulmonary effort is normal.      Breath sounds: Normal breath sounds. No wheezing, rhonchi or rales.   Abdominal:      General: Abdomen is flat. There is no distension.      Palpations: Abdomen is soft.      Tenderness: There is no abdominal tenderness.   Musculoskeletal:         General: No swelling. Normal range of motion.   Skin:     General: Skin is warm.   Neurological:      General: No focal deficit present.      Mental Status: She is alert and oriented to person, place, and time.             LAB DATA REVIEWED:    Recent Results (from the past 12 hour(s))   Extra Tubes Hold    Collection Time: 02/22/25  8:07 PM   Result Value Ref Range    Specimen HOld PST,SST     Comment:        Add-on orders for these samples will be processed based on acceptable specimen integrity and analyte stability, which may vary by analyte.   CBC with Auto Differential    Collection Time: 02/22/25  8:07 PM   Result Value Ref Range    WBC 10.6 3.6 - 11.0 K/uL    RBC 3.44 (L) 3.80 - 5.20 M/uL    Hemoglobin 11.0 (L) 11.5 - 16.0 g/dL    Hematocrit 33.1 (L) 35.0 - 47.0 %    MCV 96.2 80.0 - 99.0 FL    MCH 32.0 26.0 - 34.0 PG    MCHC 33.2 30.0 - 36.5 g/dL    RDW 14.1 11.5 - 14.5 %    Platelets 233 150 - 400 K/uL    MPV 10.6 8.9 - 12.9 FL    Nucleated RBCs 0.0 0  WBC    nRBC 0.00 0.00 - 0.01 K/uL    Neutrophils % 73.7 32.0 - 75.0 %    Lymphocytes % 17.5 12.0 - 49.0 %    Monocytes % 6.3 5.0 - 13.0 %    Eosinophils % 1.5 0.0 - 7.0 %    Basophils % 0.4 0.0 - 1.0 %    Immature Granulocytes % 0.6 (H) 0.0 - 0.5 %    Neutrophils Absolute 7.85 1.80 - 8.00 K/UL    Lymphocytes Absolute 1.86 0.80 - 3.50 K/UL

## 2025-02-23 NOTE — PROGRESS NOTES
End of Shift Note    Bedside shift change report given to Ulisses  by Lesa Fritz RN (offgoing nurse).  Report included the following information SBAR    Shift worked:    1900 pm to 0700 am   Shift summary and any significant changes:       Patient potassium was replace by iv potassium and po potassium, magnesium sulfate and calcium gluconate was administered to the patient. No sign of distress is noted, patient bed on the lowest position. Patient belonging and call light within reach.   Concerns for physician to address:       Zone phone for oncoming shift:          Activity:     Number times ambulated in hallways past shift: 0  Number of times OOB to chair past shift: 0    Cardiac:   Cardiac Monitoring: Yes           Access:  Current line(s): PIV    Genitourinary:        Respiratory:      Chronic home O2 use?: NO  Incentive spirometer at bedside: NO    GI:     Current diet:  ADULT DIET; Regular  Passing flatus: YES    Pain Management:   Patient states pain is manageable on current regimen: YES    Skin:     Interventions:      Patient Safety:  Fall Risk:         Active Consults:   None    Length of Stay:  Expected LOS: 3  Actual LOS: 0    Lesa Fritz RN

## 2025-02-24 LAB
1,25(OH)2D3 SERPL-MCNC: 63.5 PG/ML (ref 24.8–81.5)
ALBUMIN SERPL-MCNC: 2.8 G/DL (ref 3.5–5)
ALBUMIN/GLOB SERPL: 0.7 (ref 1.1–2.2)
ALP SERPL-CCNC: 118 U/L (ref 45–117)
ALT SERPL-CCNC: 9 U/L (ref 12–78)
ANION GAP SERPL CALC-SCNC: 6 MMOL/L (ref 2–12)
ANION GAP SERPL CALC-SCNC: 9 MMOL/L (ref 2–12)
AST SERPL-CCNC: 17 U/L (ref 15–37)
BASOPHILS # BLD: 0.03 K/UL (ref 0–0.1)
BASOPHILS NFR BLD: 0.4 % (ref 0–1)
BILIRUB SERPL-MCNC: 1 MG/DL (ref 0.2–1)
BUN SERPL-MCNC: 13 MG/DL (ref 6–20)
BUN SERPL-MCNC: 13 MG/DL (ref 6–20)
BUN/CREAT SERPL: 10 (ref 12–20)
BUN/CREAT SERPL: 10 (ref 12–20)
CALCIUM SERPL-MCNC: 7.3 MG/DL (ref 8.5–10.1)
CALCIUM SERPL-MCNC: 7.6 MG/DL (ref 8.5–10.1)
CHLORIDE SERPL-SCNC: 101 MMOL/L (ref 97–108)
CHLORIDE SERPL-SCNC: 99 MMOL/L (ref 97–108)
CO2 SERPL-SCNC: 24 MMOL/L (ref 21–32)
CO2 SERPL-SCNC: 29 MMOL/L (ref 21–32)
CREAT SERPL-MCNC: 1.24 MG/DL (ref 0.55–1.02)
CREAT SERPL-MCNC: 1.25 MG/DL (ref 0.55–1.02)
DIFFERENTIAL METHOD BLD: ABNORMAL
EOSINOPHIL # BLD: 0.21 K/UL (ref 0–0.4)
EOSINOPHIL NFR BLD: 2.5 % (ref 0–7)
ERYTHROCYTE [DISTWIDTH] IN BLOOD BY AUTOMATED COUNT: 14.1 % (ref 11.5–14.5)
EST. AVERAGE GLUCOSE BLD GHB EST-MCNC: 100 MG/DL
GLOBULIN SER CALC-MCNC: 4.3 G/DL (ref 2–4)
GLUCOSE BLD STRIP.AUTO-MCNC: 114 MG/DL (ref 65–117)
GLUCOSE BLD STRIP.AUTO-MCNC: 128 MG/DL (ref 65–117)
GLUCOSE BLD STRIP.AUTO-MCNC: 153 MG/DL (ref 65–117)
GLUCOSE BLD STRIP.AUTO-MCNC: 177 MG/DL (ref 65–117)
GLUCOSE SERPL-MCNC: 120 MG/DL (ref 65–100)
GLUCOSE SERPL-MCNC: 82 MG/DL (ref 65–100)
HBA1C MFR BLD: 5.1 % (ref 4–5.6)
HCT VFR BLD AUTO: 32 % (ref 35–47)
HGB BLD-MCNC: 10 G/DL (ref 11.5–16)
IMM GRANULOCYTES # BLD AUTO: 0.04 K/UL (ref 0–0.04)
IMM GRANULOCYTES NFR BLD AUTO: 0.5 % (ref 0–0.5)
LYMPHOCYTES # BLD: 1.52 K/UL (ref 0.8–3.5)
LYMPHOCYTES NFR BLD: 18.4 % (ref 12–49)
MAGNESIUM SERPL-MCNC: 1.6 MG/DL (ref 1.6–2.4)
MAGNESIUM SERPL-MCNC: 1.9 MG/DL (ref 1.6–2.4)
MCH RBC QN AUTO: 32.1 PG (ref 26–34)
MCHC RBC AUTO-ENTMCNC: 31.3 G/DL (ref 30–36.5)
MCV RBC AUTO: 102.6 FL (ref 80–99)
MONOCYTES # BLD: 0.56 K/UL (ref 0–1)
MONOCYTES NFR BLD: 6.8 % (ref 5–13)
NEUTS SEG # BLD: 5.9 K/UL (ref 1.8–8)
NEUTS SEG NFR BLD: 71.4 % (ref 32–75)
NRBC # BLD: 0 K/UL (ref 0–0.01)
NRBC BLD-RTO: 0 PER 100 WBC
PHOSPHATE SERPL-MCNC: 2.9 MG/DL (ref 2.6–4.7)
PHOSPHATE SERPL-MCNC: 3.3 MG/DL (ref 2.6–4.7)
PLATELET # BLD AUTO: 200 K/UL (ref 150–400)
PMV BLD AUTO: 10.9 FL (ref 8.9–12.9)
POTASSIUM SERPL-SCNC: 3.4 MMOL/L (ref 3.5–5.1)
POTASSIUM SERPL-SCNC: 3.8 MMOL/L (ref 3.5–5.1)
PROT SERPL-MCNC: 7.1 G/DL (ref 6.4–8.2)
RBC # BLD AUTO: 3.12 M/UL (ref 3.8–5.2)
SERVICE CMNT-IMP: ABNORMAL
SERVICE CMNT-IMP: NORMAL
SODIUM SERPL-SCNC: 134 MMOL/L (ref 136–145)
SODIUM SERPL-SCNC: 134 MMOL/L (ref 136–145)
TSH SERPL DL<=0.05 MIU/L-ACNC: 3.41 UIU/ML (ref 0.45–4.5)
WBC # BLD AUTO: 8.3 K/UL (ref 3.6–11)

## 2025-02-24 PROCEDURE — 2500000003 HC RX 250 WO HCPCS: Performed by: STUDENT IN AN ORGANIZED HEALTH CARE EDUCATION/TRAINING PROGRAM

## 2025-02-24 PROCEDURE — 82306 VITAMIN D 25 HYDROXY: CPT

## 2025-02-24 PROCEDURE — 2580000003 HC RX 258: Performed by: STUDENT IN AN ORGANIZED HEALTH CARE EDUCATION/TRAINING PROGRAM

## 2025-02-24 PROCEDURE — 82962 GLUCOSE BLOOD TEST: CPT

## 2025-02-24 PROCEDURE — 6370000000 HC RX 637 (ALT 250 FOR IP): Performed by: STUDENT IN AN ORGANIZED HEALTH CARE EDUCATION/TRAINING PROGRAM

## 2025-02-24 PROCEDURE — 84100 ASSAY OF PHOSPHORUS: CPT

## 2025-02-24 PROCEDURE — 83970 ASSAY OF PARATHORMONE: CPT

## 2025-02-24 PROCEDURE — 36415 COLL VENOUS BLD VENIPUNCTURE: CPT

## 2025-02-24 PROCEDURE — 80053 COMPREHEN METABOLIC PANEL: CPT

## 2025-02-24 PROCEDURE — 6360000002 HC RX W HCPCS: Performed by: STUDENT IN AN ORGANIZED HEALTH CARE EDUCATION/TRAINING PROGRAM

## 2025-02-24 PROCEDURE — 83735 ASSAY OF MAGNESIUM: CPT

## 2025-02-24 PROCEDURE — 85025 COMPLETE CBC W/AUTO DIFF WBC: CPT

## 2025-02-24 PROCEDURE — 83036 HEMOGLOBIN GLYCOSYLATED A1C: CPT

## 2025-02-24 PROCEDURE — 2060000000 HC ICU INTERMEDIATE R&B

## 2025-02-24 RX ORDER — CALCIUM GLUCONATE 20 MG/ML
1000 INJECTION, SOLUTION INTRAVENOUS ONCE
Status: COMPLETED | OUTPATIENT
Start: 2025-02-24 | End: 2025-02-24

## 2025-02-24 RX ORDER — METOPROLOL SUCCINATE 25 MG/1
25 TABLET, EXTENDED RELEASE ORAL DAILY
Status: ON HOLD | COMMUNITY
End: 2025-02-25 | Stop reason: HOSPADM

## 2025-02-24 RX ORDER — LANOLIN ALCOHOL/MO/W.PET/CERES
400 CREAM (GRAM) TOPICAL ONCE
Status: COMPLETED | OUTPATIENT
Start: 2025-02-24 | End: 2025-02-24

## 2025-02-24 RX ADMIN — CALCIUM GLUCONATE 1000 MG: 20 INJECTION, SOLUTION INTRAVENOUS at 10:23

## 2025-02-24 RX ADMIN — SODIUM CHLORIDE: 0.9 INJECTION, SOLUTION INTRAVENOUS at 10:21

## 2025-02-24 RX ADMIN — ENOXAPARIN SODIUM 40 MG: 100 INJECTION SUBCUTANEOUS at 09:19

## 2025-02-24 RX ADMIN — POTASSIUM BICARBONATE 40 MEQ: 782 TABLET, EFFERVESCENT ORAL at 18:29

## 2025-02-24 RX ADMIN — CALCIUM GLUCONATE 1000 MG: 20 INJECTION, SOLUTION INTRAVENOUS at 18:31

## 2025-02-24 RX ADMIN — TICAGRELOR 90 MG: 90 TABLET ORAL at 09:18

## 2025-02-24 RX ADMIN — ROSUVASTATIN CALCIUM 10 MG: 10 TABLET, FILM COATED ORAL at 20:34

## 2025-02-24 RX ADMIN — TICAGRELOR 90 MG: 90 TABLET ORAL at 20:35

## 2025-02-24 RX ADMIN — Medication 1000 UNITS: at 09:19

## 2025-02-24 RX ADMIN — Medication 400 MG: at 18:29

## 2025-02-24 RX ADMIN — SODIUM CHLORIDE, PRESERVATIVE FREE 10 ML: 5 INJECTION INTRAVENOUS at 09:21

## 2025-02-24 RX ADMIN — AMLODIPINE BESYLATE 5 MG: 5 TABLET ORAL at 09:20

## 2025-02-24 RX ADMIN — SODIUM CHLORIDE, PRESERVATIVE FREE 10 ML: 5 INJECTION INTRAVENOUS at 20:36

## 2025-02-24 RX ADMIN — ASPIRIN 81 MG: 81 TABLET, CHEWABLE ORAL at 09:20

## 2025-02-24 ASSESSMENT — PAIN SCALES - GENERAL
PAINLEVEL_OUTOF10: 0

## 2025-02-24 NOTE — PLAN OF CARE
Problem: Discharge Planning  Goal: Discharge to home or other facility with appropriate resources  2/24/2025 0509 by Michelle Tan, RN  Outcome: Progressing

## 2025-02-24 NOTE — PROGRESS NOTES
End of Shift Note    Bedside shift change report given to Rita CHATTERJEE  (oncoming nurse) by Raúl Cruz RN (offgoing nurse).  Report included the following information SBAR, MAR, and Cardiac Rhythm Normal sinus    Shift worked:  7a-7.30p     Shift summary and any significant changes:     - Calcium is getting better.  - Calcium gluconate 1000 mg X 2, potassium and mag replaced.  - Blood sent for PTH,intact and Vit D 25- Dihydroxy  -     Concerns for physician to address:       Zone phone for oncoming shift:          Activity:  Level of Assistance: Minimal assist, patient does 75% or more  Number times ambulated in hallways past shift: 0  Number of times OOB to chair past shift: 2    Cardiac:   Cardiac Monitoring: Yes      Cardiac Rhythm: Sinus rhythm    Access:  Current line(s): PIV     Genitourinary:        Respiratory:   O2 Device: None (Room air)  Chronic home O2 use?: NO  Incentive spirometer at bedside: NO    GI:  Last BM (including prior to admit): 02/23/25  Current diet:  ADULT DIET; Regular  Passing flatus: YES    Pain Management:   Patient states pain is manageable on current regimen: YES    Skin:  Fede Scale Score: 20  Interventions: Wound Offloading (Prevention Methods): Pillows, Repositioning, Turning, Elevate heels    Patient Safety:  Fall Risk: Nursing Judgement-Fall Risk High(Add Comments): Yes  Fall Risk Interventions  Nursing Judgement-Fall Risk High(Add Comments): Yes  Toilet Every 2 Hours-In Advance of Need: Yes  Hourly Visual Checks: Awake, In chair  Fall Visual Posted: Armband, Fall sign posted  Room Door Open: Yes  Alarm On: Chair  Patient Moved Closer to Nursing Station: No    Active Consults:   None    Length of Stay:  Expected LOS: 3  Actual LOS: 1    Raúl Curz RN

## 2025-02-24 NOTE — PROGRESS NOTES
Predictive Model Details          20 (Normal)  Factor Value    Calculated 2/24/2025 05:15 73% Age 74 years old    Deterioration Index Model 8% Respiratory rate 15     7% Sodium 136 mmol/L     5% Potassium abnormal (3.3 mmol/L)     3% Hematocrit abnormal (32.0 %)     2% WBC count 8.3 K/uL     1% Pulse 66     1% Pulse oximetry 97 %     0% Systolic 110     0% Temperature 98 °F (36.7 °C)       End of Shift Note    Bedside shift change report given to SHENA Olsen (oncoming nurse) by Michelle Tan RN (offgoing nurse).  Report included the following information SBAR, ED Summary, Intake/Output, MAR, Accordion, Recent Results, Med Rec Status, Cardiac Rhythm NSR, and Alarm Parameters     Shift worked:  Night     Shift summary and any significant changes:    Unremarkable shift     Concerns for physician to address:  None     Zone phone for oncoming shift:   [phone]       Activity:  Level of Assistance: Minimal assist, patient does 75% or more  Number times ambulated in hallways past shift: 0  Number of times OOB to chair past shift: 0    Cardiac:   Cardiac Monitoring: Yes      Cardiac Rhythm: Sinus rhythm    Access:  Current line(s): PIV     Genitourinary:        Respiratory:   O2 Device: None (Room air)  Chronic home O2 use?: NO  Incentive spirometer at bedside: NO    GI:  Last BM (including prior to admit): 02/23/25  Current diet:  ADULT DIET; Regular  Passing flatus: YES    Pain Management:   Patient states pain is manageable on current regimen: YES    Skin:  Fede Scale Score: 18  Interventions: Wound Offloading (Prevention Methods): Pillows, Repositioning, Turning    Patient Safety:  Fall Risk:    Fall Risk Interventions  Toilet Every 2 Hours-In Advance of Need: Yes  Hourly Visual Checks: In bed, Awake  Fall Visual Posted: Armband, Socks  Room Door Open: Yes  Alarm On: Bed, Chair  Patient Moved Closer to Nursing Station: No    Active Consults:   None    Length of Stay:  Expected LOS: 3  Actual LOS: 1    Michelle Tan  RN

## 2025-02-24 NOTE — PLAN OF CARE
Problem: Discharge Planning  Goal: Discharge to home or other facility with appropriate resources  2/24/2025 1413 by Raúl Cruz, RN  Outcome: Progressing  2/24/2025 0509 by Michelle Tan RN  Outcome: Progressing     Problem: Safety - Adult  Goal: Free from fall injury  Outcome: Progressing     Problem: Pain  Goal: Verbalizes/displays adequate comfort level or baseline comfort level  Outcome: Progressing     Problem: Chronic Conditions and Co-morbidities  Goal: Patient's chronic conditions and co-morbidity symptoms are monitored and maintained or improved  Outcome: Progressing     Problem: ABCDS Injury Assessment  Goal: Absence of physical injury  Outcome: Progressing

## 2025-02-24 NOTE — PROGRESS NOTES
Hospitalist Progress Note    NAME:   Elsi Petersen   : 1950   MRN: [id number]     Date/Time: 2025 5:34 PM  Patient PCP: Ángela Zheng MD    Estimated discharge date:   Barriers: electrolyte stability      Assessment / Plan:    Symptomatic hypocalcemia - improving  Hypokalemia  - improving  Hypomagnesemia - improving  K 3.0, Ca corrected to 6.4 today and presented with weakness, lethargy, numbness and tingling   - giving aggressive repletions, labs improving. Symptoms resolved  - Hold lasix given hypovolemia   - EKG with prolonged QTC from prior. Repeat EKG tomorrow AM  - TSH normal  - vit D and PTH pending  - Monitor on telemetry     FIDENCIO - improving  - Cr 1.42, likely secondary to nausea, vomiting, diarrhea  - S/p IVF given in the ED  - Hold metformin   - Hold lasix   - Hold valsartan for now   - Repeat BMP with hydration improving     Recent admission for cardiac arrest   Inferior wall MI s/p PCI 2025   - Continue home aspirin, brilinta      Anoxic brain injury  -  very involved   - Monitor      T2DM  - Hold home metformin  - LDSSI         Medical Decision Making:   I personally reviewed labs: cbc bmp  I personally reviewed imaging: prior CT abd  I personally reviewed EKG:   Toxic drug monitoring: monitor cbc for anemia from lovenox toxicity  Discussed case with: RN CM      Subjective:     Chief Complaint / Reason for Physician Visit  \"Followup hypocalcemia\".  Discussed with RN events overnight. Feels better today.      Objective:     VITALS:   Last 24hrs VS reviewed since prior progress note. Most recent are:  Patient Vitals for the past 24 hrs:   BP Temp Temp src Pulse Resp SpO2   25 1700 (!) 121/54 -- -- 69 17 --   25 1500 (!) 111/53 97.6 °F (36.4 °C) Oral 67 21 98 %   25 1400 (!) 108/56 -- -- 65 18 --   25 1300 (!) 108/46 -- -- 61 19 --   25 1200 (!) 113/57 -- -- 68 21 --   25 1115 (!) 100/50 97.5 °F (36.4 °C) Oral 70 16 98 %   25  1.42*  --  1.36* 1.35* 1.41* 1.25* 1.24*   CALCIUM 5.5*  --  6.1* 6.7* 7.0* 7.3* 7.6*   MG  --    < > <0.3* 0.8* 2.4 1.9 1.6   PHOS  --   --   --  3.2  --  3.3 2.9   BILITOT 0.7  --  0.6  --   --  1.0  --    AST 17  --  18  --   --  17  --    ALT 11*  --  11*  --   --  9*  --     < > = values in this interval not displayed.       Signed: David L Mendel, MD

## 2025-02-24 NOTE — CARE COORDINATION
Care Management Initial Assessment       RUR: 17% Moderate RUR  Readmission? No  1st IM letter given? Yes - 2/23  1st  letter given: No     02/24/25 0827   Service Assessment   Patient Orientation Alert and Oriented;Person;Place;Situation;Self   Cognition Alert   History Provided By Patient   Primary Caregiver Self   Support Systems Spouse/Significant Other  (Lam Petersen (Spouse)  [phone])   Patient's Healthcare Decision Maker is: Patient Declined (Legal Next of Kin Remains as Decision Maker)   PCP Verified by CM Yes   Last Visit to PCP Within last 6 months   Prior Functional Level Assistance with the following:;Cooking;Housework;Shopping   Current Functional Level Assistance with the following:;Shopping;Housework;Cooking   Can patient return to prior living arrangement Yes   Ability to make needs known: Good   Family able to assist with home care needs: Yes   Would you like for me to discuss the discharge plan with any other family members/significant others, and if so, who? Yes  (Lam Petersen (Spouse)  [phone])   Financial Resources Medicare   Community Resources None   CM/SW Referral Disease Management Education   Social/Functional History   Lives With Spouse   Type of Home House   Home Layout One level   Home Access Level entry   Home Equipment Cane   Active  No   Patient's  Info Lam Petersen (Spouse)  [phone]   Discharge Planning   Type of Residence House   Living Arrangements Spouse/Significant Other   Current Services Prior To Admission Home Care  (unable to recall the home health agency)   Potential Assistance Needed N/A   Patient expects to be discharged to: House   Services At/After Discharge   Transition of Care Consult (CM Consult) Home Health         The  (CM) conducted an initial meeting with the patient , formally introducing themselves and clarifying their role in discharge planning and transitional care. Demographic and insurance details were  verified for accuracy. Notably, the patient has prior history with home health, not able to recall the agency name and [facility].    The patient requires assistance with Instrumental Activities of Daily Living (IADLs) and not able to drive  but able to communicate needs and perform basic ADLS. The patient resides in a house with her spouse Lam Petersen,(Spouse) [phone]  that is level entry and has access to a cane.     Advance Care Planning     General Advance Care Planning (ACP) Conversation    Date of Conversation: 2/24/2025  Conducted with: Patient with Decision Making Capacity  Other persons present: None    Healthcare Decision Maker: No healthcare decision makers have been documented.       Content/Action Overview:  DECLINED ACP Conversation - will revisit periodically  Reviewed DNR/DNI and patient elects Full Code (Attempt Resuscitation)        Length of Voluntary ACP Conversation in minutes:  <16 minutes (Non-Billable)    Cayetano Carnes          02/24/25 1117   Services At/After Discharge   Transition of Care Consult (CM Consult) Discharge Planning   Services At/After Discharge None    Resource Information Provided? No   Mode of Transport at Discharge Other (see comment)  (the patient's family)   Confirm Follow Up Transport Self               Cayetano Carnes, RN  Case Management  [phone]

## 2025-02-25 ENCOUNTER — HOSPITAL ENCOUNTER (EMERGENCY)
Facility: HOSPITAL | Age: 75
Discharge: HOME | End: 2025-02-25
Payer: MEDICARE

## 2025-02-25 VITALS
SYSTOLIC BLOOD PRESSURE: 134 MMHG | RESPIRATION RATE: 18 BRPM | TEMPERATURE: 98.5 F | HEART RATE: 75 BPM | OXYGEN SATURATION: 95 % | DIASTOLIC BLOOD PRESSURE: 41 MMHG

## 2025-02-25 VITALS
BODY MASS INDEX: 28.17 KG/M2 | HEIGHT: 66 IN | OXYGEN SATURATION: 96 % | RESPIRATION RATE: 27 BRPM | DIASTOLIC BLOOD PRESSURE: 50 MMHG | WEIGHT: 175.27 LBS | TEMPERATURE: 97.5 F | SYSTOLIC BLOOD PRESSURE: 133 MMHG | HEART RATE: 73 BPM

## 2025-02-25 DIAGNOSIS — Z23 NEED FOR TETANUS BOOSTER: ICD-10-CM

## 2025-02-25 DIAGNOSIS — S61.216A LACERATION OF RIGHT LITTLE FINGER WITHOUT FOREIGN BODY WITHOUT DAMAGE TO NAIL, INITIAL ENCOUNTER: Primary | ICD-10-CM

## 2025-02-25 LAB
25(OH)D3 SERPL-MCNC: 53.1 NG/ML (ref 30–100)
ANION GAP SERPL CALC-SCNC: 7 MMOL/L (ref 2–12)
BACTERIA SPEC CULT: NORMAL
BASOPHILS # BLD: 0.04 K/UL (ref 0–0.1)
BASOPHILS NFR BLD: 0.6 % (ref 0–1)
BUN SERPL-MCNC: 13 MG/DL (ref 6–20)
BUN/CREAT SERPL: 12 (ref 12–20)
CALCIUM SERPL-MCNC: 7.8 MG/DL (ref 8.5–10.1)
CALCIUM SERPL-MCNC: 8.3 MG/DL (ref 8.5–10.1)
CC UR VC: NORMAL
CHLORIDE SERPL-SCNC: 103 MMOL/L (ref 97–108)
CO2 SERPL-SCNC: 26 MMOL/L (ref 21–32)
CREAT SERPL-MCNC: 1.1 MG/DL (ref 0.55–1.02)
DIFFERENTIAL METHOD BLD: ABNORMAL
EOSINOPHIL # BLD: 0.47 K/UL (ref 0–0.4)
EOSINOPHIL NFR BLD: 7.2 % (ref 0–7)
ERYTHROCYTE [DISTWIDTH] IN BLOOD BY AUTOMATED COUNT: 13.7 % (ref 11.5–14.5)
GLUCOSE BLD STRIP.AUTO-MCNC: 109 MG/DL (ref 65–117)
GLUCOSE BLD STRIP.AUTO-MCNC: 114 MG/DL (ref 65–117)
GLUCOSE SERPL-MCNC: 98 MG/DL (ref 65–100)
HCT VFR BLD AUTO: 31.5 % (ref 35–47)
HGB BLD-MCNC: 9.9 G/DL (ref 11.5–16)
IMM GRANULOCYTES # BLD AUTO: 0.01 K/UL (ref 0–0.04)
IMM GRANULOCYTES NFR BLD AUTO: 0.2 % (ref 0–0.5)
LYMPHOCYTES # BLD: 1.29 K/UL (ref 0.8–3.5)
LYMPHOCYTES NFR BLD: 19.6 % (ref 12–49)
MAGNESIUM SERPL-MCNC: 1.9 MG/DL (ref 1.6–2.4)
MCH RBC QN AUTO: 31.3 PG (ref 26–34)
MCHC RBC AUTO-ENTMCNC: 31.4 G/DL (ref 30–36.5)
MCV RBC AUTO: 99.7 FL (ref 80–99)
MONOCYTES # BLD: 0.47 K/UL (ref 0–1)
MONOCYTES NFR BLD: 7.2 % (ref 5–13)
NEUTS SEG # BLD: 4.29 K/UL (ref 1.8–8)
NEUTS SEG NFR BLD: 65.2 % (ref 32–75)
NRBC # BLD: 0 K/UL (ref 0–0.01)
NRBC BLD-RTO: 0 PER 100 WBC
PHOSPHATE SERPL-MCNC: 3.3 MG/DL (ref 2.6–4.7)
PLATELET # BLD AUTO: 216 K/UL (ref 150–400)
PMV BLD AUTO: 10.8 FL (ref 8.9–12.9)
POTASSIUM SERPL-SCNC: 3.5 MMOL/L (ref 3.5–5.1)
PTH-INTACT SERPL-MCNC: 99.9 PG/ML (ref 18.4–88)
RBC # BLD AUTO: 3.16 M/UL (ref 3.8–5.2)
SERVICE CMNT-IMP: NORMAL
SODIUM SERPL-SCNC: 136 MMOL/L (ref 136–145)
WBC # BLD AUTO: 6.6 K/UL (ref 3.6–11)

## 2025-02-25 PROCEDURE — 83735 ASSAY OF MAGNESIUM: CPT

## 2025-02-25 PROCEDURE — 36415 COLL VENOUS BLD VENIPUNCTURE: CPT

## 2025-02-25 PROCEDURE — 80048 BASIC METABOLIC PNL TOTAL CA: CPT

## 2025-02-25 PROCEDURE — 99284 EMERGENCY DEPT VISIT MOD MDM: CPT

## 2025-02-25 PROCEDURE — 2500000003 HC RX 250 WO HCPCS: Performed by: STUDENT IN AN ORGANIZED HEALTH CARE EDUCATION/TRAINING PROGRAM

## 2025-02-25 PROCEDURE — 6360000002 HC RX W HCPCS: Performed by: STUDENT IN AN ORGANIZED HEALTH CARE EDUCATION/TRAINING PROGRAM

## 2025-02-25 PROCEDURE — 6360000002 HC RX W HCPCS: Performed by: PHYSICIAN ASSISTANT

## 2025-02-25 PROCEDURE — 82962 GLUCOSE BLOOD TEST: CPT

## 2025-02-25 PROCEDURE — 84100 ASSAY OF PHOSPHORUS: CPT

## 2025-02-25 PROCEDURE — 6370000000 HC RX 637 (ALT 250 FOR IP): Performed by: STUDENT IN AN ORGANIZED HEALTH CARE EDUCATION/TRAINING PROGRAM

## 2025-02-25 PROCEDURE — 90715 TDAP VACCINE 7 YRS/> IM: CPT | Performed by: PHYSICIAN ASSISTANT

## 2025-02-25 PROCEDURE — 90471 IMMUNIZATION ADMIN: CPT | Performed by: PHYSICIAN ASSISTANT

## 2025-02-25 PROCEDURE — 85025 COMPLETE CBC W/AUTO DIFF WBC: CPT

## 2025-02-25 PROCEDURE — 64450 NJX AA&/STRD OTHER PN/BRANCH: CPT

## 2025-02-25 RX ORDER — LANOLIN ALCOHOL/MO/W.PET/CERES
400 CREAM (GRAM) TOPICAL DAILY
Status: DISCONTINUED | OUTPATIENT
Start: 2025-02-26 | End: 2025-02-25 | Stop reason: HOSPADM

## 2025-02-25 RX ORDER — LANOLIN ALCOHOL/MO/W.PET/CERES
400 CREAM (GRAM) TOPICAL DAILY
Qty: 30 TABLET | Refills: 0 | Status: SHIPPED | OUTPATIENT
Start: 2025-02-26 | End: 2025-03-28

## 2025-02-25 RX ORDER — LIDOCAINE HYDROCHLORIDE 20 MG/ML
5 INJECTION, SOLUTION EPIDURAL; INFILTRATION; INTRACAUDAL; PERINEURAL
Status: DISCONTINUED | OUTPATIENT
Start: 2025-02-25 | End: 2025-02-25 | Stop reason: HOSPADM

## 2025-02-25 RX ADMIN — ENOXAPARIN SODIUM 40 MG: 100 INJECTION SUBCUTANEOUS at 10:08

## 2025-02-25 RX ADMIN — TICAGRELOR 90 MG: 90 TABLET ORAL at 10:08

## 2025-02-25 RX ADMIN — AMLODIPINE BESYLATE 5 MG: 5 TABLET ORAL at 10:11

## 2025-02-25 RX ADMIN — Medication 1000 UNITS: at 10:08

## 2025-02-25 RX ADMIN — ASPIRIN 81 MG: 81 TABLET, CHEWABLE ORAL at 10:09

## 2025-02-25 RX ADMIN — SODIUM CHLORIDE, PRESERVATIVE FREE 10 ML: 5 INJECTION INTRAVENOUS at 10:11

## 2025-02-25 RX ADMIN — TETANUS TOXOID, REDUCED DIPHTHERIA TOXOID AND ACELLULAR PERTUSSIS VACCINE, ADSORBED 0.5 ML: 5; 2.5; 8; 8; 2.5 SUSPENSION INTRAMUSCULAR at 19:41

## 2025-02-25 ASSESSMENT — VISUAL ACUITY: OU: 1

## 2025-02-25 ASSESSMENT — LIFESTYLE VARIABLES
HOW MANY STANDARD DRINKS CONTAINING ALCOHOL DO YOU HAVE ON A TYPICAL DAY: PATIENT DOES NOT DRINK
HOW OFTEN DO YOU HAVE A DRINK CONTAINING ALCOHOL: NEVER

## 2025-02-25 ASSESSMENT — PAIN SCALES - GENERAL
PAINLEVEL_OUTOF10: 0

## 2025-02-25 ASSESSMENT — PAIN - FUNCTIONAL ASSESSMENT: PAIN_FUNCTIONAL_ASSESSMENT: 0-10

## 2025-02-25 NOTE — ED PROVIDER NOTES
[facility] EMERGENCY DEPARTMENT  EMERGENCY DEPARTMENT ENCOUNTER       Pt Name: Elsi Petersen  MRN: [id number]  Birthdate 1950  Date of evaluation: 2/25/2025  Provider: ANGELITA Lopez   PCP: Ángela Zheng MD  Note Started: 6:26 PM EST 2/25/25     CHIEF COMPLAINT       Chief Complaint   Patient presents with    Laceration     Pt arrives via wheelchair w CC of laceration to R pinky finger onset today after cutting it on wheelchair following a discharge from this facility. Pt is unsure of last tetanus shot.         HISTORY OF PRESENT ILLNESS: 1 or more elements      History From: Patient  HPI Limitations: None     Elsi Petersen is a 74 y.o. female who presents from upstairs with a laceration to the tip of her right pinky.  She tells me she was discharged and was on her way home when her finger got \"pinched\" in a wheelchair.  She is on no blood thinners.  She is right-hand dominant.  She will need a tetanus booster.     Nursing Notes were all reviewed and agreed with or any disagreements were addressed in the HPI.     REVIEW OF SYSTEMS      Review of Systems   Skin:  Positive for wound.        Positives and Pertinent negatives as per HPI.    PAST HISTORY     Past Medical History:  No past medical history on file.    Past Surgical History:  Past Surgical History:   Procedure Laterality Date    CARDIAC PROCEDURE N/A 1/13/2025    Left heart cath / coronary angiography performed by Jair Gupta MD at [facility] CARDIAC CATH LAB    CARDIAC PROCEDURE N/A 1/13/2025    Percutaneous coronary intervention performed by Jair Gupta MD at [facility] CARDIAC CATH LAB    CHOLECYSTECTOMY      ORTHOPEDIC SURGERY      back and foot       Family History:  No family history on file.    Social History:  Social History     Tobacco Use    Smoking status: Never    Smokeless tobacco: Never   Substance Use Topics    Alcohol use: Yes     Comment: one drink/ week    Drug use: Never       Allergies:  Allergies   Allergen

## 2025-02-25 NOTE — PLAN OF CARE
Discharged patient this afternoon. Shortly after medical discharge, while being readied for discharge with nursing in her room, alerted that patient had cut her 5th finger getting into the wheelchair to leave the room. Evalled finger myself, appears to have a significant cut on the lateral aspect of her 5th digit, distal to the DIP. Concerned she may need irrigation and possible stitches. Discussed with Surgery, whom recommended Orthopedic or ED evaluation. Discussed with Orthopedics, recommended patient be evaluated in the ED. Discussed with the ED director and charge nurse, relayed information and thanked for their assistance.    David L Mendel, MD

## 2025-02-25 NOTE — CONSULTS
Consultation Note    NAME: Elsi Petersen   :  1950   MRN:  [id number]     Date/Time:  2025 3:22 PM    I have been asked to see this patient by Dr. Mendel  for advice/opinion re: hypocalcemia.     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 Unfortunately, she has tried other agents and coming off PPI and has done poorly. Let's try continuing daily PPI along with MgOx 400 mg PO daily. Hold valsartan and lasix for now.    OK for discharge today. Recommend outpt f/u to trend Mg, Ca, PTH and creatinine (check on Friday).       Subjective:   CHIEF COMPLAINT:  hypocalcemia    HISTORY OF PRESENT ILLNESS:     Elsi is a 74 y.o.   female who has a history of the below. Oob in chair. Feels great now. Numbness and tingling to lower legs, arms and perioral for a few days prior to admission. Now gone. Mild loose bowels prior to admission. Vomiting a couple of times on the day of admission.     Chart reviewed in detail.    I reviewed the plan in detail with Dr. Mendel and with the [facility]ola[facility].  History reviewed. No pertinent past medical history.   Past Surgical History:   Procedure Laterality Date    CARDIAC PROCEDURE N/A 2025    Left heart cath / coronary angiography performed by Jair Gupta MD at [facility] CARDIAC CATH LAB    CARDIAC PROCEDURE N/A 2025    Percutaneous coronary intervention performed by Jair Gupta MD at [facility] CARDIAC CATH LAB    CHOLECYSTECTOMY      ORTHOPEDIC SURGERY      back and foot  diarrhea, and abdominal pain  :  negative for frequency, dysuria  Integument:  negative for rash and pruritus  Heme:  negative for easy bruising and gum/nose bleeding  Musculoskel: negative for myalgias,  back pain and muscle weakness  Neuro:  negative for headaches, dizziness, vertigo  Psych:  negative for feelings of anxiety, depression   Travel?: none    Objective:   VITALS:    Vitals:    02/25/25 1510   BP: (!) 133/50   Pulse: 73   Resp: 27   Temp: 97.5 °F (36.4 °C)   SpO2: 96%     PHYSICAL EXAM:  Gen:  [x]  WD [x]  WN  [] cachectic []  thin []  obese []  disheveled             []  ill apearing  []   Critical  []   Chronic    [x]  No acute distress    HEENT:   [x] NC/AT/PERRL    [] pink conjunctivae      [] pale conjunctivae                  PERRL  [] yes  [] no      [] moist mucosa    [] dry mucosa    hearing intact to voice [x] yes  [] No                 NECK:   supple [] yes  [] no        masses [] yes  [] No               thyroid  []  non tender  []  tender    RESP:   [] CTA bilaterally/no wheezing/rhonchi/rales/crackles    [] rhonchi bilaterally - no dullness  [] wheezing   [] rhonchi   [] crackles     use of accessory muscles [] yes [x] no    CARD:   []  regular rate and rhythm/No murmurs/rubs/gallops    murmur  [] yes ()  [] no      Rubs  [] yes  [] no       Gallops [] yes  [] no    Rate []  regular  []  irregular        carotid bruits  [] Right  []  Left                 LE edema [] yes  [x] no           JVP  []  yes   []  no    ABD:    [] soft/non distended/non tender/+bowel sounds/no HSM    []  Rigid    tenderness [] yes [] no   Liver enlargement  []  yes []  no                Spleen enlargement  []  yes []  no     distended []  yes [] no     bowel sound  [] hypoactive   [] hyperactive    LYMPH:    Neck []  yes []  no       Axillae []  yes []  no    SKIN:   Rashes []  yes   []  no    Ulcers []  yes   []  no               [] tight to palpitation    skin turgor []  good  [] poor  [] decreased

## 2025-02-25 NOTE — PLAN OF CARE
Problem: Discharge Planning  Goal: Discharge to home or other facility with appropriate resources  2/24/2025 2322 by Rita Lobato RN  Outcome: Progressing  2/24/2025 1413 by Raúl Cruz RN  Outcome: Progressing     Problem: Safety - Adult  Goal: Free from fall injury  2/24/2025 2322 by Rita Lobato RN  Outcome: Progressing  2/24/2025 1413 by Raúl Cruz RN  Outcome: Progressing     Problem: Pain  Goal: Verbalizes/displays adequate comfort level or baseline comfort level  2/24/2025 2322 by Rita Lobato RN  Outcome: Progressing  2/24/2025 1413 by Raúl Cruz RN  Outcome: Progressing     Problem: Chronic Conditions and Co-morbidities  Goal: Patient's chronic conditions and co-morbidity symptoms are monitored and maintained or improved  2/24/2025 2322 by Rita Lobato RN  Outcome: Progressing  2/24/2025 1413 by Raúl Cruz RN  Outcome: Progressing     Problem: ABCDS Injury Assessment  Goal: Absence of physical injury  2/24/2025 2322 by Rita Lobato RN  Outcome: Progressing  2/24/2025 1413 by Raúl Cruz RN  Outcome: Progressing

## 2025-02-25 NOTE — DISCHARGE SUMMARY
daily PPI along with MgOx 400 mg PO daily.   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 Stoery for resumption     Anoxic brain injury  -  very involved   - Monitor      T2DM  - held home metformin, resume on discharge  - LDSSI          Discharge Exam:  Patient seen and examined by me on discharge day.  Pertinent Findings:  Patient Vitals for the past 24 hrs:   BP Temp Temp src Pulse Resp SpO2   02/25/25 1510 (!) 133/50 97.5 °F (36.4 °C) Oral 73 27 96 %   02/25/25 1045 (!) 136/52 97.5 °F (36.4 °C) Oral 68 17 97 %   02/25/25 1011 (!) 117/55 -- -- -- -- --   02/25/25 1000 (!) 117/55 -- -- 71 13 --   02/25/25 0930 122/65 -- -- 73 18 --   02/25/25 0715 (!) 109/49 97.8 °F (36.6 °C) Oral 63 16 98 %   02/24/25 2339 134/61 97.4 °F (36.3 °C) Oral 71 21 92 %   02/24/25 2338 -- -- -- 71 20 97 %   02/24/25 2003 -- -- -- 75 19 --   02/24/25 2002 -- -- -- 72 17 --   02/24/25 2001 -- -- -- 72 20 --   02/24/25 2000 120/63 97.7 °F (36.5 °C) Oral 69 15 96 %   02/24/25 1959 -- -- -- 70 18 --   02/24/25 1958 -- -- -- 69 15 --   02/24/25 1957 -- -- -- 70 18 --       Gen:    Not in distress  Chest: Clear lungs  CVS:   Regular rate.  No edema  Abd:  Soft, not distended, not tender  Neuro: awake, moving all exts    Discharge/Recent Laboratory Results:  Recent Labs     02/25/25  0644      K 3.5      CO2 26   BUN 13   CREATININE 1.10*   GLUCOSE 98   CALCIUM 8.3*   PHOS 3.3   MG 1.9     Recent Labs     02/25/25  0644   HGB 9.9*   HCT 31.5*   WBC 6.6          Discharge Medications:     Medication List        START  you would like to set up an appt to be seen for a follow up while waiting to be seen by your PCP.    Anai Ulrich MD  [address]  [facility] [address] [phone]    Follow up in 3 day(s)  to have your labwork checked    Guilherme Storey MD  [address] [facility] [address] [phone]    Follow up in 1 week(s)  regarding your heart medications          Total time in minutes spent coordinating this discharge (includes going over instructions, follow-up, prescriptions, and preparing report for sign off to her PCP) :  35 minutes    David L Mendel, MD

## 2025-02-25 NOTE — PROGRESS NOTES
Attempted to schedule a PCP hospital follow up. Unable to reach the office and unable to leave a voicemail. [facility] placed [facility] information AVS for patient resource. Pending patient discharge. Beatrice Greco, Care Management Assistant

## 2025-02-25 NOTE — PROGRESS NOTES
Patient rested in bed this shift, VSS, denies pain,discomfort, up to bathroom x2 nurse only alerted by bed alarm, education provided regarding fall safety and importance of call bell usage, patient expressed understanding but requires reinforcement, will cont to monitor.

## 2025-02-25 NOTE — DISCHARGE INSTRUCTIONS
You were treated for very low electrolytes. You were seen by the kidney doctor and started on magnesium supplements. Your medications have changed. Please followup with Dr. Ulrich to have your labwork checked this Friday. Please followup with your heart doctor and your primary care doctor as well. Followup with your Cardiologist as well.

## 2025-02-25 NOTE — PLAN OF CARE
Problem: Discharge Planning  Goal: Discharge to home or other facility with appropriate resources  2/25/2025 1059 by Raúl Cruz RN  Outcome: Progressing  2/24/2025 2322 by Rita Lobato RN  Outcome: Progressing     Problem: Safety - Adult  Goal: Free from fall injury  2/25/2025 1059 by Raúl Cruz RN  Outcome: Progressing  2/24/2025 2322 by Rita Lobato RN  Outcome: Progressing     Problem: Pain  Goal: Verbalizes/displays adequate comfort level or baseline comfort level  2/25/2025 1059 by Raúl Cruz RN  Outcome: Progressing  2/24/2025 2322 by Rita Lobato RN  Outcome: Progressing  Flowsheets (Taken 2/24/2025 2000)  Verbalizes/displays adequate comfort level or baseline comfort level:   Encourage patient to monitor pain and request assistance   Assess pain using appropriate pain scale   Administer analgesics based on type and severity of pain and evaluate response   Implement non-pharmacological measures as appropriate and evaluate response     Problem: Chronic Conditions and Co-morbidities  Goal: Patient's chronic conditions and co-morbidity symptoms are monitored and maintained or improved  2/25/2025 1059 by Raúl Cruz RN  Outcome: Progressing  2/24/2025 2322 by Rita Lobato RN  Outcome: Progressing  Flowsheets (Taken 2/24/2025 2000)  Care Plan - Patient's Chronic Conditions and Co-Morbidity Symptoms are Monitored and Maintained or Improved:   Monitor and assess patient's chronic conditions and comorbid symptoms for stability, deterioration, or improvement   Collaborate with multidisciplinary team to address chronic and comorbid conditions and prevent exacerbation or deterioration   Update acute care plan with appropriate goals if chronic or comorbid symptoms are exacerbated and prevent overall improvement and discharge     Problem: ABCDS Injury Assessment  Goal: Absence of physical injury  2/25/2025 1059 by Raúl Cruz RN  Outcome:

## 2025-02-26 NOTE — PROGRESS NOTES
1740: Patient discharged this afternoon, while she was sitting in the wheelchair, she cut her 5th finger. I guess her hand might slipped from the arm rest and got under the wheelchair. She screamed, after that I noticed her hand was under. I carefully , not forcefully take that out and noticed there was a cut. Notified Charge nurse, supervisor, Unit director, Dr. Mendel and ED charge nurse notified. She wheeled to ED for irrigation and possible stitches.

## (undated) DEVICE — COPILOT BLEEDBACK CONTROL VALVE: Brand: COPILOT

## (undated) DEVICE — CATHETER COR DIAG 4.0 5FR 100CM 0 SIDE H

## (undated) DEVICE — KIT ACCS INTRO 4FR L10CM NDL 21GA L7CM GWIRE L40CM

## (undated) DEVICE — PROVE COVER: Brand: UNBRANDED

## (undated) DEVICE — SYRINGE ANGIO 10 CC BRL STD PRNT POLYCARB LT BLU MEDALLION

## (undated) DEVICE — CATHETER BLLN 142CM  SPRINTER LEGEND RX 2MM X 12MM GW

## (undated) DEVICE — SC 3W HP RA OFF NB - PG: Brand: NAMIC

## (undated) DEVICE — HI-TORQUE VERSACORE FLOPPY GUIDE WIRE SYSTEM 145 CM: Brand: HI-TORQUE VERSACORE

## (undated) DEVICE — GUIDEWIRE VASC L260CM 0.035IN J TIP L3MM PTFE FIX COR NAMIC

## (undated) DEVICE — SPLINT WR VELC FOAM NEUT POS DISP FOR RAD ART ACC SFT STRP

## (undated) DEVICE — 3M™ TEGADERM™ TRANSPARENT FILM DRESSING FRAME STYLE, 1626W, 4 IN X 4-3/4 IN (10 CM X 12 CM), 50/CT 4CT/CASE: Brand: 3M™ TEGADERM™

## (undated) DEVICE — CUSTOM KT PTCA INFL DEV K05 00053H (ORDER MUTLIPLES OF 5 EACH)

## (undated) DEVICE — CATHETER GUID 6FR L100CM DIA0.071IN NYL SHFT EBU3.5

## (undated) DEVICE — RUNTHROUGH NS EXTRA FLOPPY PTCA GUIDEWIRE: Brand: RUNTHROUGH

## (undated) DEVICE — HEART CATH-MRMC: Brand: MEDLINE INDUSTRIES, INC.

## (undated) DEVICE — GLIDESHEATH SLENDER ACCESS KIT: Brand: GLIDESHEATH SLENDER

## (undated) DEVICE — CATHETER COR DIAG PIGTAILS PIG 145 CRV 5FR 110CM 6 SIDE H

## (undated) DEVICE — TR BAND RADIAL ARTERY COMPRESSION DEVICE: Brand: TR BAND

## (undated) DEVICE — TUBING PRSS MON L6IN PVC M FEM CONN